# Patient Record
Sex: FEMALE | Race: WHITE | Employment: FULL TIME | URBAN - METROPOLITAN AREA
[De-identification: names, ages, dates, MRNs, and addresses within clinical notes are randomized per-mention and may not be internally consistent; named-entity substitution may affect disease eponyms.]

---

## 2017-01-03 ENCOUNTER — HOSPITAL ENCOUNTER (INPATIENT)
Age: 22
LOS: 2 days | Discharge: HOME | DRG: 770 | End: 2017-01-05
Attending: OBSTETRICS & GYNECOLOGY | Admitting: OBSTETRICS & GYNECOLOGY
Payer: COMMERCIAL

## 2017-01-03 ENCOUNTER — ANESTHESIA EVENT (OUTPATIENT)
Dept: SURGERY | Age: 22
DRG: 770 | End: 2017-01-03
Payer: COMMERCIAL

## 2017-01-03 PROBLEM — O03.5 ENDOMETRITIS FOLLOWING ABORTIVE PREGNANCY: Status: ACTIVE | Noted: 2017-01-03

## 2017-01-03 LAB
ALBUMIN SERPL BCP-MCNC: 3.3 G/DL (ref 3.5–5)
ALBUMIN/GLOB SERPL: 0.9 {RATIO} (ref 1.1–2.2)
ALP SERPL-CCNC: 59 U/L (ref 45–117)
ALT SERPL-CCNC: 21 U/L (ref 12–78)
ANION GAP BLD CALC-SCNC: 8 MMOL/L (ref 5–15)
AST SERPL W P-5'-P-CCNC: 12 U/L (ref 15–37)
BASOPHILS # BLD AUTO: 0 K/UL (ref 0–0.1)
BASOPHILS # BLD: 0 % (ref 0–1)
BILIRUB SERPL-MCNC: 0.2 MG/DL (ref 0.2–1)
BUN SERPL-MCNC: 13 MG/DL (ref 6–20)
BUN/CREAT SERPL: 17 (ref 12–20)
CALCIUM SERPL-MCNC: 8.3 MG/DL (ref 8.5–10.1)
CHLORIDE SERPL-SCNC: 108 MMOL/L (ref 97–108)
CO2 SERPL-SCNC: 27 MMOL/L (ref 21–32)
CREAT SERPL-MCNC: 0.77 MG/DL (ref 0.55–1.02)
EOSINOPHIL # BLD: 0 K/UL (ref 0–0.4)
EOSINOPHIL NFR BLD: 1 % (ref 0–7)
ERYTHROCYTE [DISTWIDTH] IN BLOOD BY AUTOMATED COUNT: 13.6 % (ref 11.5–14.5)
GLOBULIN SER CALC-MCNC: 3.8 G/DL (ref 2–4)
GLUCOSE SERPL-MCNC: 83 MG/DL (ref 65–100)
HCG SERPL-ACNC: 100 MIU/ML (ref 0–6)
HCT VFR BLD AUTO: 31.7 % (ref 35–47)
HGB BLD-MCNC: 10.2 G/DL (ref 11.5–16)
LACTATE SERPL-SCNC: 1 MMOL/L (ref 0.4–2)
LYMPHOCYTES # BLD AUTO: 31 % (ref 12–49)
LYMPHOCYTES # BLD: 1.9 K/UL (ref 0.8–3.5)
MCH RBC QN AUTO: 28.3 PG (ref 26–34)
MCHC RBC AUTO-ENTMCNC: 32.2 G/DL (ref 30–36.5)
MCV RBC AUTO: 88.1 FL (ref 80–99)
MONOCYTES # BLD: 0.5 K/UL (ref 0–1)
MONOCYTES NFR BLD AUTO: 8 % (ref 5–13)
NEUTS SEG # BLD: 3.7 K/UL (ref 1.8–8)
NEUTS SEG NFR BLD AUTO: 60 % (ref 32–75)
PLATELET # BLD AUTO: 203 K/UL (ref 150–400)
POTASSIUM SERPL-SCNC: 3.5 MMOL/L (ref 3.5–5.1)
PROT SERPL-MCNC: 7.1 G/DL (ref 6.4–8.2)
RBC # BLD AUTO: 3.6 M/UL (ref 3.8–5.2)
SODIUM SERPL-SCNC: 143 MMOL/L (ref 136–145)
WBC # BLD AUTO: 6.1 K/UL (ref 3.6–11)

## 2017-01-03 PROCEDURE — 87040 BLOOD CULTURE FOR BACTERIA: CPT | Performed by: OBSTETRICS & GYNECOLOGY

## 2017-01-03 PROCEDURE — 87086 URINE CULTURE/COLONY COUNT: CPT | Performed by: OBSTETRICS & GYNECOLOGY

## 2017-01-03 PROCEDURE — 85025 COMPLETE CBC W/AUTO DIFF WBC: CPT | Performed by: OBSTETRICS & GYNECOLOGY

## 2017-01-03 PROCEDURE — 65270000029 HC RM PRIVATE

## 2017-01-03 PROCEDURE — 83605 ASSAY OF LACTIC ACID: CPT | Performed by: OBSTETRICS & GYNECOLOGY

## 2017-01-03 PROCEDURE — 80053 COMPREHEN METABOLIC PANEL: CPT | Performed by: OBSTETRICS & GYNECOLOGY

## 2017-01-03 PROCEDURE — 36415 COLL VENOUS BLD VENIPUNCTURE: CPT | Performed by: OBSTETRICS & GYNECOLOGY

## 2017-01-03 PROCEDURE — 74011000258 HC RX REV CODE- 258: Performed by: OBSTETRICS & GYNECOLOGY

## 2017-01-03 PROCEDURE — 74011000250 HC RX REV CODE- 250: Performed by: OBSTETRICS & GYNECOLOGY

## 2017-01-03 PROCEDURE — 74011250637 HC RX REV CODE- 250/637: Performed by: OBSTETRICS & GYNECOLOGY

## 2017-01-03 PROCEDURE — 74011250636 HC RX REV CODE- 250/636: Performed by: OBSTETRICS & GYNECOLOGY

## 2017-01-03 PROCEDURE — 84702 CHORIONIC GONADOTROPIN TEST: CPT | Performed by: OBSTETRICS & GYNECOLOGY

## 2017-01-03 RX ORDER — OXYCODONE AND ACETAMINOPHEN 5; 325 MG/1; MG/1
2 TABLET ORAL
Status: DISCONTINUED | OUTPATIENT
Start: 2017-01-03 | End: 2017-01-05

## 2017-01-03 RX ORDER — OXYCODONE AND ACETAMINOPHEN 5; 325 MG/1; MG/1
1 TABLET ORAL ONCE
Status: DISCONTINUED | OUTPATIENT
Start: 2017-01-03 | End: 2017-01-03

## 2017-01-03 RX ORDER — LIDOCAINE HYDROCHLORIDE 10 MG/ML
0.1 INJECTION, SOLUTION EPIDURAL; INFILTRATION; INTRACAUDAL; PERINEURAL AS NEEDED
Status: DISCONTINUED | OUTPATIENT
Start: 2017-01-03 | End: 2017-01-04 | Stop reason: HOSPADM

## 2017-01-03 RX ORDER — HYDROMORPHONE HYDROCHLORIDE 1 MG/ML
.2-.5 INJECTION, SOLUTION INTRAMUSCULAR; INTRAVENOUS; SUBCUTANEOUS ONCE
Status: DISCONTINUED | OUTPATIENT
Start: 2017-01-03 | End: 2017-01-03

## 2017-01-03 RX ORDER — MORPHINE SULFATE 10 MG/ML
2 INJECTION, SOLUTION INTRAMUSCULAR; INTRAVENOUS
Status: DISCONTINUED | OUTPATIENT
Start: 2017-01-03 | End: 2017-01-03

## 2017-01-03 RX ORDER — HYDROMORPHONE HYDROCHLORIDE 1 MG/ML
0.5 INJECTION, SOLUTION INTRAMUSCULAR; INTRAVENOUS; SUBCUTANEOUS
Status: DISCONTINUED | OUTPATIENT
Start: 2017-01-03 | End: 2017-01-03

## 2017-01-03 RX ORDER — IBUPROFEN 400 MG/1
400 TABLET ORAL
Status: DISCONTINUED | OUTPATIENT
Start: 2017-01-03 | End: 2017-01-04

## 2017-01-03 RX ORDER — SODIUM CHLORIDE 0.9 % (FLUSH) 0.9 %
5-10 SYRINGE (ML) INJECTION EVERY 8 HOURS
Status: DISCONTINUED | OUTPATIENT
Start: 2017-01-03 | End: 2017-01-05 | Stop reason: HOSPADM

## 2017-01-03 RX ORDER — HYDROMORPHONE HYDROCHLORIDE 1 MG/ML
1 INJECTION, SOLUTION INTRAMUSCULAR; INTRAVENOUS; SUBCUTANEOUS
Status: DISCONTINUED | OUTPATIENT
Start: 2017-01-03 | End: 2017-01-04

## 2017-01-03 RX ORDER — SODIUM CHLORIDE 0.9 % (FLUSH) 0.9 %
5-10 SYRINGE (ML) INJECTION AS NEEDED
Status: DISCONTINUED | OUTPATIENT
Start: 2017-01-03 | End: 2017-01-03

## 2017-01-03 RX ORDER — ONDANSETRON 2 MG/ML
4 INJECTION INTRAMUSCULAR; INTRAVENOUS
Status: DISCONTINUED | OUTPATIENT
Start: 2017-01-03 | End: 2017-01-05 | Stop reason: HOSPADM

## 2017-01-03 RX ORDER — SODIUM CHLORIDE 0.9 % (FLUSH) 0.9 %
5-10 SYRINGE (ML) INJECTION EVERY 8 HOURS
Status: DISCONTINUED | OUTPATIENT
Start: 2017-01-03 | End: 2017-01-04 | Stop reason: HOSPADM

## 2017-01-03 RX ORDER — SODIUM CHLORIDE 0.9 % (FLUSH) 0.9 %
5-10 SYRINGE (ML) INJECTION AS NEEDED
Status: DISCONTINUED | OUTPATIENT
Start: 2017-01-03 | End: 2017-01-05 | Stop reason: HOSPADM

## 2017-01-03 RX ORDER — SODIUM CHLORIDE, SODIUM LACTATE, POTASSIUM CHLORIDE, CALCIUM CHLORIDE 600; 310; 30; 20 MG/100ML; MG/100ML; MG/100ML; MG/100ML
25 INJECTION, SOLUTION INTRAVENOUS CONTINUOUS
Status: DISCONTINUED | OUTPATIENT
Start: 2017-01-03 | End: 2017-01-03

## 2017-01-03 RX ORDER — SODIUM CHLORIDE, SODIUM LACTATE, POTASSIUM CHLORIDE, CALCIUM CHLORIDE 600; 310; 30; 20 MG/100ML; MG/100ML; MG/100ML; MG/100ML
150 INJECTION, SOLUTION INTRAVENOUS CONTINUOUS
Status: DISCONTINUED | OUTPATIENT
Start: 2017-01-03 | End: 2017-01-05 | Stop reason: HOSPADM

## 2017-01-03 RX ORDER — DIPHENHYDRAMINE HYDROCHLORIDE 50 MG/ML
12.5 INJECTION, SOLUTION INTRAMUSCULAR; INTRAVENOUS AS NEEDED
Status: DISCONTINUED | OUTPATIENT
Start: 2017-01-03 | End: 2017-01-03

## 2017-01-03 RX ORDER — NALOXONE HYDROCHLORIDE 0.4 MG/ML
0.4 INJECTION, SOLUTION INTRAMUSCULAR; INTRAVENOUS; SUBCUTANEOUS AS NEEDED
Status: DISCONTINUED | OUTPATIENT
Start: 2017-01-03 | End: 2017-01-05 | Stop reason: HOSPADM

## 2017-01-03 RX ORDER — OXYCODONE AND ACETAMINOPHEN 5; 325 MG/1; MG/1
1 TABLET ORAL
Status: DISCONTINUED | OUTPATIENT
Start: 2017-01-03 | End: 2017-01-03

## 2017-01-03 RX ORDER — FENTANYL CITRATE 50 UG/ML
25 INJECTION, SOLUTION INTRAMUSCULAR; INTRAVENOUS
Status: DISCONTINUED | OUTPATIENT
Start: 2017-01-03 | End: 2017-01-03

## 2017-01-03 RX ADMIN — CEFOXITIN SODIUM 2 G: 2 POWDER, FOR SOLUTION INTRAVENOUS at 19:45

## 2017-01-03 RX ADMIN — ONDANSETRON 4 MG: 2 INJECTION INTRAMUSCULAR; INTRAVENOUS at 15:40

## 2017-01-03 RX ADMIN — DOXYCYCLINE 100 MG: 100 INJECTION, POWDER, LYOPHILIZED, FOR SOLUTION INTRAVENOUS at 16:51

## 2017-01-03 RX ADMIN — HYDROMORPHONE HYDROCHLORIDE 0.5 MG: 1 INJECTION, SOLUTION INTRAMUSCULAR; INTRAVENOUS; SUBCUTANEOUS at 15:39

## 2017-01-03 RX ADMIN — HYDROMORPHONE HYDROCHLORIDE 1 MG: 1 INJECTION, SOLUTION INTRAMUSCULAR; INTRAVENOUS; SUBCUTANEOUS at 23:55

## 2017-01-03 RX ADMIN — OXYCODONE HYDROCHLORIDE AND ACETAMINOPHEN 1 TABLET: 5; 325 TABLET ORAL at 14:20

## 2017-01-03 RX ADMIN — Medication 10 ML: at 20:55

## 2017-01-03 RX ADMIN — CEFOXITIN SODIUM 2 G: 2 POWDER, FOR SOLUTION INTRAVENOUS at 16:00

## 2017-01-03 RX ADMIN — Medication 10 ML: at 15:53

## 2017-01-03 RX ADMIN — OXYCODONE HYDROCHLORIDE AND ACETAMINOPHEN 1 TABLET: 5; 325 TABLET ORAL at 20:55

## 2017-01-03 RX ADMIN — HYDROMORPHONE HYDROCHLORIDE 0.5 MG: 1 INJECTION, SOLUTION INTRAMUSCULAR; INTRAVENOUS; SUBCUTANEOUS at 19:42

## 2017-01-03 RX ADMIN — Medication 10 ML: at 23:55

## 2017-01-03 RX ADMIN — SODIUM CHLORIDE, SODIUM LACTATE, POTASSIUM CHLORIDE, AND CALCIUM CHLORIDE 150 ML/HR: 600; 310; 30; 20 INJECTION, SOLUTION INTRAVENOUS at 15:52

## 2017-01-03 RX ADMIN — ONDANSETRON 4 MG: 2 INJECTION INTRAMUSCULAR; INTRAVENOUS at 19:42

## 2017-01-03 NOTE — PROGRESS NOTES
Primary Nurse Jon Villa and Kin Dior, RN performed a dual skin assessment on this patient No impairment noted  Donald score is 22; Slight redness noted to bilateral AC areas;  Patient stated it came from previous IV administration from hospital yesterday

## 2017-01-03 NOTE — PERIOP NOTES
Per Mi at [facility], pt admitted to Room 2103 for IV abx.   MD will do ultrasound in am.

## 2017-01-03 NOTE — IP AVS SNAPSHOT
[address] [facility] 
[phone] Patient: Tamiko Downs MRN: [id number] :[id number] You are allergic to the following Allergen Reactions Tramadol Itching Immunizations Administered for This Admission Name Date Influenza Vaccine (Quad) PF  Deferred () Recent Documentation Height Weight Breastfeeding? BMI OB Status Smoking Status 1.6 m 63 kg No 24.62 kg/m2 Pregnant Light Tobacco Smoker Unresulted Labs Order Current Status CULTURE, BLOOD, PAIRED Preliminary result Emergency Contacts Name Discharge Info Relation Home Work Mobile Darrin Garcia DISCHARGE CAREGIVER [3] Parent [1]   [phone] About your hospitalization You were admitted on:  January 3, 2017 You last received care in the:  [facility] 2 GENERAL SURGERY You were discharged on:  2017 Unit phone number:  [phone] Why you were hospitalized Your primary diagnosis was:  Not on File Your diagnoses also included:  Endometritis Following Abortive Pregnancy Providers Seen During Your Hospitalizations Provider Role Specialty Primary office phone Teofilo Caldwell MD Attending Provider Obstetrics & Gynecology [phone] Your Primary Care Physician (PCP) Primary Care Physician Office Phone Office Fax Nilay Martel [phone] Follow-up Information Follow up With Details Comments Contact Info Jordan Maya [phone] [facility] [address] [phone] Current Discharge Medication List  
  
START taking these medications Dose & Instructions Dispensing Information Comments Morning Noon Evening Bedtime  
 ibuprofen 800 mg tablet Commonly known as:  MOTRIN Your next dose is: Today, Tomorrow Other:  _________  Dose:  800 mg  
 Take 1 Tab by mouth three (3) times daily. Quantity:  60 Tab Refills:  1  
     
   
   
   
  
 ondansetron 4 mg disintegrating tablet Commonly known as:  ZOFRAN ODT Your next dose is: Today, Tomorrow Other:  _________ Dose:  4 mg Take 1 Tab by mouth every eight (8) hours as needed for Nausea. Quantity:  15 Tab Refills:  0 CONTINUE these medications which have NOT CHANGED Dose & Instructions Dispensing Information Comments Morning Noon Evening Bedtime  
 acetaminophen 325 mg tablet Commonly known as:  TYLENOL Your next dose is: Today, Tomorrow Other:  _________ Dose:  650 mg Take 2 Tabs by mouth every six (6) hours as needed for Pain. Quantity:  20 Tab Refills:  0  
     
   
   
   
  
 citalopram 10 mg tablet Commonly known as:  Lenny Peel Your next dose is: Today, Tomorrow Other:  _________ Take  by mouth daily. Refills:  0 HYDROcodone-acetaminophen 5-325 mg per tablet Commonly known as:  Silva Jack Your next dose is: Today, Tomorrow Other:  _________ Dose:  1 Tab Take 1 Tab by mouth every four (4) hours as needed for Pain. Max Daily Amount: 6 Tabs. Quantity:  24 Tab Refills:  0 Where to Get Your Medications Information on where to get these meds will be given to you by the nurse or doctor. ! Ask your nurse or doctor about these medications HYDROcodone-acetaminophen 5-325 mg per tablet  
 ibuprofen 800 mg tablet  
 ondansetron 4 mg disintegrating tablet Discharge Instructions After Care Instructions For Your D&C 
 
 
1. You may resume your usual diet once the nausea resolves. Initially, try sips of warm fluids and a bland diet. 2. Avoid heavy lifting and straining. Gradually increase your activity. First, try walking and doing light activity around the house. Resume your normal habits if no significant discomfort or bleeding develops. Most women can return to work within one to four days after this procedure. 3. You may take showers. Avoid using a tub bath, swimming pool or hot tub until after your check-up. 4. Do not place anything in your vagina until after your postoperative visit. Do not  
douche, use tampons, or have intercourse because this may cause bleeding and  
infection. 5. You may initially experience a heavy bloody discharge. This should not be more than your menstrual flow. Over the next several days, the flow should steadily decrease. 6. Typically following the procedure, there is little or no pain. You may feel cramps in your lower abdomen. Tylenol may relieve mild cramping. If pain medication does not improve your symptoms, you should contact your physician. 7. Contact the office if you have excessive bleeding (saturating a pad an hour for two hours or passing large clots). It is also necessary to speak with your physician if you develop chills, a temperature greater than 100.4, difficulty voiding or burning on urination. 8. Your physician may want to see you in the office after your D&C. Please call for an appointment if this has not already been arranged. Our office phone number is [phone]. If appropriate, the microscopic results from your procedure will be discussed at this follow-up visit. Discharge Orders None [employer] Announcement We are excited to announce that we are making your provider's discharge notes available to you in [employer]. You will see these notes when they are completed and signed by the physician that discharged you from your recent hospital stay.   If you have any questions or concerns about any information you see in [employer], please call the [place] Department where you were seen or reach out to your Primary Care Provider for more information about your plan of care. Introducing [address]! [facility] introduces [employer] patient portal. Now you can access parts of your medical record, email your doctor's office, and request medication refills online. 1. In your internet browser, go to [email or web address] 2. Click on the First Time User? Click Here link in the Sign In box. You will see the New Member Sign Up page. 3. Enter your [employer] Access Code exactly as it appears below. You will not need to use this code after youve completed the sign-up process. If you do not sign up before the expiration date, you must request a new code. · [employer] Access Code: [email or web address] Expires: 3/2/2017  4:00 PM 
 
4. Enter the last four digits of your Social Security Number (xxxx) and Date of Birth (mm/dd/yyyy) as indicated and click Submit. You will be taken to the next sign-up page. 5. Create a [employer] ID. This will be your [employer] login ID and cannot be changed, so think of one that is secure and easy to remember. 6. Create a [employer] password. You can change your password at any time. 7. Enter your Password Reset Question and Answer. This can be used at a later time if you forget your password. 8. Enter your e-mail address. You will receive e-mail notification when new information is available in [address] 9. Click Sign Up. You can now view and download portions of your medical record. 10. Click the Download Summary menu link to download a portable copy of your medical information. If you have questions, please visit the Frequently Asked Questions section of the [employer] website. Remember, [employer] is NOT to be used for urgent needs. For medical emergencies, dial 911. Now available from your iPhone and Android! General Information Please provide this summary of care documentation to your next provider. Patient Signature:  ____________________________________________________________ Date:  ____________________________________________________________  
  
Earnstine Amadou Provider Signature:  ____________________________________________________________ Date:  ____________________________________________________________

## 2017-01-03 NOTE — PROGRESS NOTES
End of Shift Nursing Note    Bedside shift change report given to Selena De La Cruz (oncoming nurse) by Vanessa Varela RN (offgoing nurse). Report included the following information SBAR, Kardex, Intake/Output and MAR. Zone Phone:   [phone]    Significant changes during shift:    none   Non-emergent issues for physician to address:   none     Number times ambulated in hallway past shift: 0      Number of times OOB to chair past shift: 0    POD #: admitted 1/3/17     Vital Signs:    Temp: 98.4 °F (36.9 °C)     Pulse (Heart Rate): 81     BP: 107/58     Resp Rate: 16     O2 Sat (%): 100 %       Skin Integrity:      Wounds: no   Dressings Present: no    Wound Concerns: no      GI:    Current diet:  DIET REGULAR  DIET NPO    Nausea: YES  Vomiting: YES  Bowel Sounds: YES  Flatus: YES  Last Bowel Movement: several days ago     Respiratory:  Supplemental O2: NO     Incentive Spirometer: NO    Coughing and Deep Breathing: NO  Oral Care: YES  Understanding (patient/family education): YES   Getting out of bed: YES  Head of bed elevation: YES    Patient Safety:    Falls Score: 1  Mobility Score: 1  Bed Alarm On? NO  Sitter? NO      Opportunity for questions and clarification was given to oncoming nurse. Patient bed is in supine position, side rails are up x 2, , call bell within reach and patient not in distress.     Garry Tello

## 2017-01-03 NOTE — H&P
Vital Signs   24Years Old Female  Height:  65 inches  Weight: 141 pounds  BMI:      23.55  BSA:      1.71  BP:       100/62    Past Pregnancy History   : 3  Para:     2  Aborta:  1  Term: 2, Premature: 0, Living Children: 2, Vaginal Deliveries: 2, C-Sections: 0, Elect. Ab: 0, Spont. Ab: 1, Ectopics: 0    Gynecologic History   Patient is unsure of LMP  Additional Menses Information: mid october   Does patient have any problems with urine leakage? no  Does patient have any other bladder problems? no  History of abnormal pap: no  Gardasil Injection History: Not Applicable  Pt currently sexually active: yes  Current Contraception: None. History of STD: no   The patient opts not to have HIV testing today. Urine Pregnancy Test         Urine Pregnancy Test Result: positive  Test Performed By: Zenaida Burgess - Team 1 GREG at January  3, 2017 11:19 AM        Visit Type:  Problem GYN  Primary Provider:  Magali Kern MD    CC:  follow up after SAB. History of Present Illness:  23 YO patient presents today for ER follow up after SAB. Pateint notes she miscarried 2 weeks ago but is still passing large clots and tissue and has severe pain. Patient notes she has been feverish with headache. Patient notes she has not had medical attention since her miscarriage 2 weeks ago, she notes she was 11 weeks when she miscarried. Patient notes she did not have a dating ultrasound so the 11 weeks is coming from her LMP   Has US report from [facility] [address]   shows interval decrease in ES frm prior US 16  No GS but concern for retained POC following SAB   Called [facility] to access further labs and records       temp today 99.8      last seen , transferred practices during pregnancy due to insurance coverage- delivered there @39wks. Has not been seen there since, was scheduled for US & visit but miscarried. Has not had bloodwork/US anywhere besides ER.     Allergies    This patient has no known allergies. Medications Removed from Medication List          Past Medical History:     Reviewed history from 2011 and no changes required:        neg    Past Surgical History:     Reviewed history from 2011 and no changes required:        Spontaneous Vaginal Delivery male [id number] Dr. Alaina Clark         2015  39wks   (Dr Alee Cowan)     Family History Summary:      Reviewed history Last on 2015 and no changes required:2017  Father (Primo Brady) - Has Family History of Hypertension - Entered On: 2015  Other family member - Has Family History of Diabetes - Entered On: 2015    General Comments - FH:  Family history transferred to [address] [place] [address]          Social History:     Reviewed history from 2011 and no changes required:        Single        home schooled      Previous Tobacco Use: Signed On - 2015  Smoked Tobacco Use:  Former smoker     Cigarettes:  Yes    Previous Alcohol Use: Signed On - 2015  Alcohol use:  no  Seatbelt use:  preg- %    PAP Smear History:     Date of Last PAP Smear:  2011      Past Pregnancy History      :  3     Term Births:  2     Premature Births: 0     Living Children: 2     Para:   2     Mult. Births:  0     Prev : 0     Aborta:  1     Elect. Ab:  0     Spont. Ab:  1     Ectopics:  0    Pregnancy # 1     Delivery date:   2011     Weeks Gestation: 39w      labor:   no     Delivery type:        Anesthesia type:   epidural     Delivery location:   [facility]     Infant Sex:  Male     Birth weight:  3.01kg     Name:  Imtiaz Davenport     Comments:  Delivery Clinician Dr. Alaina Clark  Second degree laceration. 11 Circumcision consent obtained. Dorsal Penile Nerve Block (DPNB) 0.8cc of 1% Lidocaine and Sweet Ease. 1.1 Gomco used. Tolerated well. Dr. Madelon Sever  . ................................................................ Erwin Roberts **Surg Charges/Prorates  2011 10:22 AM      Pregnancy # 2     Delivery date:   7/2016     Infant Sex:  Female     Comments:  uncomplicated obeyviery  Dr Milton Small       Review of Systems        See HPI    Except as noted in the HPI, the review of systems is negative for General and . General Medical Physical Exam:     General Appearance:       lethargic     Head: Inspection:  normocephalic without obvious abnormalities    Eyes:        External:  EOM intact    Ears, Nose, Throat:        External:  normocephalic and atraumatic       Hearing:  grossly intact    Neck:        Neck:  supple; no masses; trachea midline       Thyroid:  no nodules, masses, tenderness, or enlargement    Respiratory:        Resp. effort:  no use of accessory muscles       Auscultation:   no rales, rhonchi, or wheezes    Cardiovascular: Auscultation:   normal S1 and  S2; no murmur, rub, or gallop       Peripheral circ: no cyanosis, clubbing, or edema    Gastrointestinal:        Abdomen:  diffuse tenderness       Liver/spleen:   normal to percussion; no enlargement or nodularity       Hernia:  no hernias    Genitourinary:        Ext. genitalia: normal appearance; no lesions or discharge       Urethra:  no discharge       Bladder:  no cystocele       Vagina:  old blood in vault.  No active bleeding        Cervix:  normal appearance; no lesions or discharge       Uterus:  mobile, tender        Adnexa:  no masses or tenderness    Musculoskeletal:        Gait/station:  normal gait    Lymphatic:        Neck:  no cervical adenopathy       Axilla:  no axillary adenopathy       Inguinal:  no inguinal adenopathy    Skin:        Inspection:  no rashes, suspicious lesions, or ulcerations       Palpation:  warm to touch     Neurological:        Other:  grossly intact    Psychiatric:       Orientation:  oriented to time, place, and person            Impression & Recommendations:    Problem # 1:  Retained products of conception (ICD-639.1) (PJS10-H77.1)  most recent US 1/2/17 done at [place]  with thickened ES and concern for retained POC   No images available   Per patient she was told to follow-up emergently as needed IV antibiotics and D&C  Was given shot of antibiotics in Ed, home with RX for doxycycline--unable to tolerate meds due to vomiting   bhcg yesterday 123   CBC Hgb 11.4, WBC 6.9   UA negative   Direct admit for IV antibiotics. Repeat labs CBC, bhcg in am   Scheduled for suction D&C tomorrow after receiving IV antibiotics. However if bhcg declining would repeat US prior to [facility]    Orders:  High Complex Est level 5 (QPZ-69528)  Specimen Handling (CPT-41900)  GC/CH ([id number])      Problem # 2:  Endometritis acute (ICD-615.0) (GIN98-K18.0)  clinical picture consistent with acute endometritis following incomplete SAB   GC/Ct sent from office   will monitor for signs of sepsis   Admt for IV antibiotics   Cefoxitin, doxycycline  IV fluid hydration   Pain control   Serial exams. [place] in am for surgery suction D&C tomorrow  Discussed plan with Dr Dana cross MD today   Orders:  High Complex Est level 5 (LSS-55780)  Specimen Handling (CPT-48830)  GC/CH ([id number])      Other Orders:  BHCG Qual Urine done today (CPT-22010)  BHCG Qual Urine done today (CPT-44794)  BHCG Qual Urine done today (CPT-42532)  BHCG Qual Urine done today (CPT-44600)  BHCG Qual Urine done today (RJL-46957)    Medications (at conclusion of this visit)          Electronically signed by Satish Nunez MD on 01/03/2017 at 12:17 PM    ________________________________________________________________________    Date of Admission Update:  Goldy Pelayo was seen and examined. 23 y/o [id number] ~2wks s/p SAB admitted directly from the office for endometritis and questionable retained POC. No PN care at time of SAB - 11wks by dates but no confirmed IUP. Pt reports heavy vaginal bleeding and seeing \"a sac. \" Developed F(103)/C/body aches starting 3 days ago and presented [facility] ED yesterday.  Thought she merely had the flu but denies sick contacts. Labs yesterday apparently wnl (normal WBC) and pelvic US showing thickened EM. Given shot of abx and sent home w/ doxycycline Rx which she was unable to tolerate. Apparently told to f/u in office today as she \"needs admission for IV antibiotics. \" Pt having foul-smelling vaginal bleeding and pelvic pain. Some associated N/V. No void in 3 days. Normal BMs. Visit Vitals    BP 95/51 (BP 1 Location: Right arm, BP Patient Position: At rest)    Pulse 100    Temp 97.8 °F (36.6 °C)    Resp 16    LMP 10/14/2016 (Approximate)    SpO2 100%    Breastfeeding No     Physical Exam   Constitutional: She is oriented to person, place, and time and well-developed, well-nourished, and in no distress. Appears fatigued   HENT:   Head: Normocephalic and atraumatic. Eyes: EOM are normal.   Neck: Neck supple. Cardiovascular: Normal rate, regular rhythm and normal heart sounds. Pulmonary/Chest: Effort normal.   Abdominal: Soft. There is tenderness in the suprapubic area and left lower quadrant. There is no rebound, no guarding and no CVA tenderness. Genitourinary:   Genitourinary Comments: See office exam   Musculoskeletal: Normal range of motion. Neurological: She is alert and oriented to person, place, and time. Skin: Skin is warm and dry. There is pallor.    Psychiatric: Mood, memory, affect and judgment normal.     A/P: 25 y/o  with endometritis following SAB  Admit for IV broad spectrum abx - cefoxitin & doxycycline  IV hydration  Labs/cultures pending, repeat hCG and CBC in AM  GC/CT pending (obtained in office today)  NPO after MN in preparation for [facility]  tomorrow if deemed necessary  Possible pelvic US tomorrow    Signed By: Nba Tobar MD     January 3, 2017 3:38 PM

## 2017-01-03 NOTE — PERIOP NOTES
Spoke to [employer] and advised to have pt NPO after midnight; consent signed if available; have IV heplocked.  Will transport by [facility] if pt does need to come to [facility]

## 2017-01-03 NOTE — IP AVS SNAPSHOT
Current Discharge Medication List  
  
Take these medications at their scheduled times Dose & Instructions Dispensing Information Comments Morning Noon Evening Bedtime  
 citalopram 10 mg tablet Commonly known as:  Sid Sanon Your next dose is: Today, Tomorrow Other:  ____________ Take  by mouth daily. Refills:  0  
     
   
   
   
  
 ibuprofen 800 mg tablet Commonly known as:  MOTRIN Your next dose is: Today, Tomorrow Other:  ____________ Dose:  800 mg Take 1 Tab by mouth three (3) times daily. Quantity:  60 Tab Refills:  1 Take these medications as needed Dose & Instructions Dispensing Information Comments Morning Noon Evening Bedtime  
 acetaminophen 325 mg tablet Commonly known as:  TYLENOL Your next dose is: Today, Tomorrow Other:  ____________ Dose:  650 mg Take 2 Tabs by mouth every six (6) hours as needed for Pain. Quantity:  20 Tab Refills:  0 HYDROcodone-acetaminophen 5-325 mg per tablet Commonly known as:  Devin French Your next dose is: Today, Tomorrow Other:  ____________ Dose:  1 Tab Take 1 Tab by mouth every four (4) hours as needed for Pain. Max Daily Amount: 6 Tabs. Quantity:  24 Tab Refills:  0  
     
   
   
   
  
 ondansetron 4 mg disintegrating tablet Commonly known as:  ZOFRAN ODT Your next dose is: Today, Tomorrow Other:  ____________ Dose:  4 mg Take 1 Tab by mouth every eight (8) hours as needed for Nausea. Quantity:  15 Tab Refills:  0 Where to Get Your Medications Information about where to get these medications is not yet available ! Ask your nurse or doctor about these medications HYDROcodone-acetaminophen 5-325 mg per tablet  
 ibuprofen 800 mg tablet  
 ondansetron 4 mg disintegrating tablet

## 2017-01-04 ENCOUNTER — ANESTHESIA (OUTPATIENT)
Dept: SURGERY | Age: 22
DRG: 770 | End: 2017-01-04
Payer: COMMERCIAL

## 2017-01-04 LAB
BASOPHILS # BLD AUTO: 0 K/UL (ref 0–0.1)
BASOPHILS # BLD: 0 % (ref 0–1)
EOSINOPHIL # BLD: 0.1 K/UL (ref 0–0.4)
EOSINOPHIL NFR BLD: 1 % (ref 0–7)
ERYTHROCYTE [DISTWIDTH] IN BLOOD BY AUTOMATED COUNT: 13.6 % (ref 11.5–14.5)
HCG SERPL-ACNC: 78 MIU/ML (ref 0–6)
HCT VFR BLD AUTO: 27.9 % (ref 35–47)
HGB BLD-MCNC: 9 G/DL (ref 11.5–16)
LYMPHOCYTES # BLD AUTO: 41 % (ref 12–49)
LYMPHOCYTES # BLD: 2.5 K/UL (ref 0.8–3.5)
MCH RBC QN AUTO: 28.6 PG (ref 26–34)
MCHC RBC AUTO-ENTMCNC: 32.3 G/DL (ref 30–36.5)
MCV RBC AUTO: 88.6 FL (ref 80–99)
MONOCYTES # BLD: 0.4 K/UL (ref 0–1)
MONOCYTES NFR BLD AUTO: 7 % (ref 5–13)
NEUTS SEG # BLD: 3.1 K/UL (ref 1.8–8)
NEUTS SEG NFR BLD AUTO: 51 % (ref 32–75)
PLATELET # BLD AUTO: 189 K/UL (ref 150–400)
RBC # BLD AUTO: 3.15 M/UL (ref 3.8–5.2)
WBC # BLD AUTO: 6 K/UL (ref 3.6–11)

## 2017-01-04 PROCEDURE — 77030018836 HC SOL IRR NACL ICUM -A: Performed by: OBSTETRICS & GYNECOLOGY

## 2017-01-04 PROCEDURE — 74011250636 HC RX REV CODE- 250/636

## 2017-01-04 PROCEDURE — 74011000250 HC RX REV CODE- 250: Performed by: OBSTETRICS & GYNECOLOGY

## 2017-01-04 PROCEDURE — 88305 TISSUE EXAM BY PATHOLOGIST: CPT | Performed by: OBSTETRICS & GYNECOLOGY

## 2017-01-04 PROCEDURE — 76210000035 HC AMBSU PH I REC 1 TO 1.5 HR: Performed by: OBSTETRICS & GYNECOLOGY

## 2017-01-04 PROCEDURE — 74011250636 HC RX REV CODE- 250/636: Performed by: OBSTETRICS & GYNECOLOGY

## 2017-01-04 PROCEDURE — 10D17ZZ EXTRACTION OF PRODUCTS OF CONCEPTION, RETAINED, VIA NATURAL OR ARTIFICIAL OPENING: ICD-10-PCS | Performed by: OBSTETRICS & GYNECOLOGY

## 2017-01-04 PROCEDURE — 74011000258 HC RX REV CODE- 258: Performed by: OBSTETRICS & GYNECOLOGY

## 2017-01-04 PROCEDURE — 84702 CHORIONIC GONADOTROPIN TEST: CPT | Performed by: OBSTETRICS & GYNECOLOGY

## 2017-01-04 PROCEDURE — 74011250637 HC RX REV CODE- 250/637: Performed by: OBSTETRICS & GYNECOLOGY

## 2017-01-04 PROCEDURE — 36415 COLL VENOUS BLD VENIPUNCTURE: CPT | Performed by: OBSTETRICS & GYNECOLOGY

## 2017-01-04 PROCEDURE — 85025 COMPLETE CBC W/AUTO DIFF WBC: CPT | Performed by: OBSTETRICS & GYNECOLOGY

## 2017-01-04 PROCEDURE — 77030020255 HC SOL INJ LR 1000ML BG: Performed by: OBSTETRICS & GYNECOLOGY

## 2017-01-04 PROCEDURE — 65270000029 HC RM PRIVATE

## 2017-01-04 PROCEDURE — 77030030437 HC FLTR UTER VAC OCOA -A: Performed by: OBSTETRICS & GYNECOLOGY

## 2017-01-04 PROCEDURE — 77030018846 HC SOL IRR STRL H20 ICUM -A: Performed by: OBSTETRICS & GYNECOLOGY

## 2017-01-04 PROCEDURE — 74011250636 HC RX REV CODE- 250/636: Performed by: ANESTHESIOLOGY

## 2017-01-04 PROCEDURE — 77030008578 HC TBNG UTER SUC BUSS -A: Performed by: OBSTETRICS & GYNECOLOGY

## 2017-01-04 PROCEDURE — 76060000061 HC AMB SURG ANES 0.5 TO 1 HR: Performed by: OBSTETRICS & GYNECOLOGY

## 2017-01-04 PROCEDURE — 76030000000 HC AMB SURG OR TIME 0.5 TO 1: Performed by: OBSTETRICS & GYNECOLOGY

## 2017-01-04 PROCEDURE — 77030003666 HC NDL SPINAL BD -A: Performed by: OBSTETRICS & GYNECOLOGY

## 2017-01-04 PROCEDURE — 74011000250 HC RX REV CODE- 250

## 2017-01-04 PROCEDURE — 74011250637 HC RX REV CODE- 250/637: Performed by: ANESTHESIOLOGY

## 2017-01-04 RX ORDER — DOXYCYCLINE HYCLATE 100 MG
100 TABLET ORAL EVERY 12 HOURS
Status: DISCONTINUED | OUTPATIENT
Start: 2017-01-04 | End: 2017-01-04

## 2017-01-04 RX ORDER — MIDAZOLAM HYDROCHLORIDE 1 MG/ML
INJECTION, SOLUTION INTRAMUSCULAR; INTRAVENOUS AS NEEDED
Status: DISCONTINUED | OUTPATIENT
Start: 2017-01-04 | End: 2017-01-04 | Stop reason: HOSPADM

## 2017-01-04 RX ORDER — IBUPROFEN 200 MG
1 TABLET ORAL DAILY
Status: DISCONTINUED | OUTPATIENT
Start: 2017-01-05 | End: 2017-01-04

## 2017-01-04 RX ORDER — SCOLOPAMINE TRANSDERMAL SYSTEM 1 MG/1
1.5 PATCH, EXTENDED RELEASE TRANSDERMAL
Status: DISCONTINUED | OUTPATIENT
Start: 2017-01-04 | End: 2017-01-05 | Stop reason: HOSPADM

## 2017-01-04 RX ORDER — SODIUM CHLORIDE, SODIUM LACTATE, POTASSIUM CHLORIDE, CALCIUM CHLORIDE 600; 310; 30; 20 MG/100ML; MG/100ML; MG/100ML; MG/100ML
25 INJECTION, SOLUTION INTRAVENOUS CONTINUOUS
Status: DISCONTINUED | OUTPATIENT
Start: 2017-01-04 | End: 2017-01-05 | Stop reason: HOSPADM

## 2017-01-04 RX ORDER — IBUPROFEN 200 MG
1 TABLET ORAL DAILY
Status: DISCONTINUED | OUTPATIENT
Start: 2017-01-04 | End: 2017-01-05 | Stop reason: HOSPADM

## 2017-01-04 RX ORDER — KETOROLAC TROMETHAMINE 30 MG/ML
30 INJECTION, SOLUTION INTRAMUSCULAR; INTRAVENOUS
Status: ACTIVE | OUTPATIENT
Start: 2017-01-04 | End: 2017-01-04

## 2017-01-04 RX ORDER — FENTANYL CITRATE 50 UG/ML
INJECTION, SOLUTION INTRAMUSCULAR; INTRAVENOUS AS NEEDED
Status: DISCONTINUED | OUTPATIENT
Start: 2017-01-04 | End: 2017-01-04 | Stop reason: HOSPADM

## 2017-01-04 RX ORDER — SODIUM CHLORIDE, SODIUM LACTATE, POTASSIUM CHLORIDE, CALCIUM CHLORIDE 600; 310; 30; 20 MG/100ML; MG/100ML; MG/100ML; MG/100ML
INJECTION, SOLUTION INTRAVENOUS
Status: DISCONTINUED | OUTPATIENT
Start: 2017-01-04 | End: 2017-01-04 | Stop reason: HOSPADM

## 2017-01-04 RX ORDER — CITALOPRAM 20 MG/1
10 TABLET, FILM COATED ORAL DAILY
Status: DISCONTINUED | OUTPATIENT
Start: 2017-01-05 | End: 2017-01-05 | Stop reason: HOSPADM

## 2017-01-04 RX ORDER — LIDOCAINE HYDROCHLORIDE 20 MG/ML
INJECTION, SOLUTION EPIDURAL; INFILTRATION; INTRACAUDAL; PERINEURAL AS NEEDED
Status: DISCONTINUED | OUTPATIENT
Start: 2017-01-04 | End: 2017-01-04 | Stop reason: HOSPADM

## 2017-01-04 RX ORDER — LIDOCAINE HYDROCHLORIDE 10 MG/ML
10 INJECTION, SOLUTION EPIDURAL; INFILTRATION; INTRACAUDAL; PERINEURAL ONCE
Status: DISCONTINUED | OUTPATIENT
Start: 2017-01-04 | End: 2017-01-04 | Stop reason: HOSPADM

## 2017-01-04 RX ORDER — IBUPROFEN 400 MG/1
800 TABLET ORAL 3 TIMES DAILY
Status: DISCONTINUED | OUTPATIENT
Start: 2017-01-04 | End: 2017-01-05 | Stop reason: HOSPADM

## 2017-01-04 RX ORDER — KETOROLAC TROMETHAMINE 30 MG/ML
INJECTION, SOLUTION INTRAMUSCULAR; INTRAVENOUS
Status: COMPLETED
Start: 2017-01-04 | End: 2017-01-04

## 2017-01-04 RX ORDER — PROPOFOL 10 MG/ML
INJECTION, EMULSION INTRAVENOUS AS NEEDED
Status: DISCONTINUED | OUTPATIENT
Start: 2017-01-04 | End: 2017-01-04 | Stop reason: HOSPADM

## 2017-01-04 RX ADMIN — ONDANSETRON 4 MG: 2 INJECTION INTRAMUSCULAR; INTRAVENOUS at 10:17

## 2017-01-04 RX ADMIN — CEFOXITIN SODIUM 2 G: 2 POWDER, FOR SOLUTION INTRAVENOUS at 08:13

## 2017-01-04 RX ADMIN — MIDAZOLAM HYDROCHLORIDE 2 MG: 1 INJECTION, SOLUTION INTRAMUSCULAR; INTRAVENOUS at 13:02

## 2017-01-04 RX ADMIN — IBUPROFEN 800 MG: 400 TABLET ORAL at 15:53

## 2017-01-04 RX ADMIN — MIDAZOLAM HYDROCHLORIDE 1 MG: 1 INJECTION, SOLUTION INTRAMUSCULAR; INTRAVENOUS at 13:10

## 2017-01-04 RX ADMIN — Medication 10 ML: at 21:08

## 2017-01-04 RX ADMIN — OXYCODONE HYDROCHLORIDE AND ACETAMINOPHEN 2 TABLET: 5; 325 TABLET ORAL at 09:06

## 2017-01-04 RX ADMIN — PROPOFOL 100 MG: 10 INJECTION, EMULSION INTRAVENOUS at 13:10

## 2017-01-04 RX ADMIN — SODIUM CHLORIDE, SODIUM LACTATE, POTASSIUM CHLORIDE, AND CALCIUM CHLORIDE 150 ML/HR: 600; 310; 30; 20 INJECTION, SOLUTION INTRAVENOUS at 01:35

## 2017-01-04 RX ADMIN — OXYCODONE HYDROCHLORIDE AND ACETAMINOPHEN 2 TABLET: 5; 325 TABLET ORAL at 15:52

## 2017-01-04 RX ADMIN — DOXYCYCLINE 100 MG: 100 INJECTION, POWDER, LYOPHILIZED, FOR SOLUTION INTRAVENOUS at 17:18

## 2017-01-04 RX ADMIN — KETOROLAC TROMETHAMINE 30 MG: 30 INJECTION, SOLUTION INTRAMUSCULAR at 13:54

## 2017-01-04 RX ADMIN — CEFOXITIN SODIUM 2 G: 2 POWDER, FOR SOLUTION INTRAVENOUS at 15:53

## 2017-01-04 RX ADMIN — OXYCODONE HYDROCHLORIDE AND ACETAMINOPHEN 2 TABLET: 5; 325 TABLET ORAL at 01:44

## 2017-01-04 RX ADMIN — ONDANSETRON 4 MG: 2 INJECTION INTRAMUSCULAR; INTRAVENOUS at 05:27

## 2017-01-04 RX ADMIN — PROPOFOL 100 MG: 10 INJECTION, EMULSION INTRAVENOUS at 13:05

## 2017-01-04 RX ADMIN — HYDROMORPHONE HYDROCHLORIDE 1 MG: 1 INJECTION, SOLUTION INTRAMUSCULAR; INTRAVENOUS; SUBCUTANEOUS at 05:27

## 2017-01-04 RX ADMIN — SODIUM CHLORIDE, SODIUM LACTATE, POTASSIUM CHLORIDE, CALCIUM CHLORIDE: 600; 310; 30; 20 INJECTION, SOLUTION INTRAVENOUS at 13:02

## 2017-01-04 RX ADMIN — LIDOCAINE HYDROCHLORIDE 100 MG: 20 INJECTION, SOLUTION EPIDURAL; INFILTRATION; INTRACAUDAL; PERINEURAL at 13:05

## 2017-01-04 RX ADMIN — FENTANYL CITRATE 50 MCG: 50 INJECTION, SOLUTION INTRAMUSCULAR; INTRAVENOUS at 13:14

## 2017-01-04 RX ADMIN — PROPOFOL 50 MG: 10 INJECTION, EMULSION INTRAVENOUS at 13:15

## 2017-01-04 RX ADMIN — MIDAZOLAM HYDROCHLORIDE 2 MG: 1 INJECTION, SOLUTION INTRAMUSCULAR; INTRAVENOUS at 13:05

## 2017-01-04 RX ADMIN — Medication 10 ML: at 05:28

## 2017-01-04 RX ADMIN — IBUPROFEN 800 MG: 400 TABLET ORAL at 21:07

## 2017-01-04 RX ADMIN — SODIUM CHLORIDE, SODIUM LACTATE, POTASSIUM CHLORIDE, AND CALCIUM CHLORIDE 25 ML/HR: 600; 310; 30; 20 INJECTION, SOLUTION INTRAVENOUS at 11:50

## 2017-01-04 RX ADMIN — DOXYCYCLINE 100 MG: 100 INJECTION, POWDER, LYOPHILIZED, FOR SOLUTION INTRAVENOUS at 03:31

## 2017-01-04 RX ADMIN — OXYCODONE HYDROCHLORIDE AND ACETAMINOPHEN 2 TABLET: 5; 325 TABLET ORAL at 21:07

## 2017-01-04 RX ADMIN — DOXYCYCLINE HYCLATE 100 MG: 100 TABLET, COATED ORAL at 15:53

## 2017-01-04 RX ADMIN — FENTANYL CITRATE 50 MCG: 50 INJECTION, SOLUTION INTRAMUSCULAR; INTRAVENOUS at 13:05

## 2017-01-04 RX ADMIN — CEFOXITIN SODIUM 2 G: 2 POWDER, FOR SOLUTION INTRAVENOUS at 01:46

## 2017-01-04 NOTE — PROGRESS NOTES
End of Shift Nursing Note    Bedside shift change report given to Keyur Galo RN (oncoming nurse) by Rhea Yeager RN (offgoing nurse). Report included the following information SBAR, Kardex, Intake/Output, MAR and Recent Results. Zone Phone:       Significant changes during shift:    Hypotensive, systolic in the low 43'H   Non-emergent issues for physician to address:   none     Number times ambulated in hallway past shift: 1      Number of times OOB to chair past shift: 0    POD #:      Vital Signs:    Temp: 97.5 °F (36.4 °C)     Pulse (Heart Rate): 63     BP: (!) 85/52     Resp Rate: 18     O2 Sat (%): 92 %    Lines & Drains:     Urinary Catheter? NO   Placement Date:    Medical Necessity:   Central Line? NO   Placement Date:    Medical Necessity:   PICC Line? NO   Placement Date:    Medical Necessity:     NG tube [] in [] removed [x] not applicable   Drains [] in [] removed [x] not applicable     Skin Integrity:      Wounds: no   Dressings Present: no    Wound Concerns: no      GI:    Current diet:  DIET NPO    Nausea: YES  Vomiting: YES  Bowel Sounds: YES  Flatus: YES  Last Bowel Movement: several days ago   Appearance:     Respiratory:  Supplemental O2: NO      Device:    via  Liters/min     Incentive Spirometer: YES  Volume:   Coughing and Deep Breathing: YES  Oral Care: YES  Understanding (patient/family education): YES   Getting out of bed: YES  Head of bed elevation: YES    Patient Safety:    Falls Score: 1  Mobility Score: 4  Bed Alarm On? NO  Sitter? NO      Opportunity for questions and clarification was given to oncoming nurse. Patient bed is in low position, side rails are up x 2, door & observation blinds open as needed, call bell within reach and patient not in distress.     Guzman Arias RN

## 2017-01-04 NOTE — PROGRESS NOTES
Pt is a 24 y.o  female admitted with Endometritis following abortive pregnancy. Pt was in bed resting with her step mother at the bedside. Demographic information verified and CM will update address. Pt lives with her mom in a 2 story home with steps. Prior to admission, pt was independent with her ADL's and IADL's and doesn't use any DME. Pt prefers the [employer] on [employer]. Pt drives but doesn't have a car at this time. Family will transport her home at discharge. CM will continue to follow for any discharge planning needs. Care Management Interventions  PCP Verified by CM: Yes Ferny Chun - sees often)  Mode of Transport at Discharge: Other (see comment) (family will transport)  Transition of Care Consult (CM Consult): Discharge Planning  Discharge Durable Medical Equipment: No  Physical Therapy Consult: No  Occupational Therapy Consult: No  Speech Therapy Consult: No  Current Support Network:  Other, Own Home (lives with her mom in a 2 story home with steps)  Confirm Follow Up Transport: Family  Discharge Location  Discharge Placement: [facility] [address]

## 2017-01-04 NOTE — PERIOP NOTES
Patient: Luis Short MRN: [id number]  SSN: [id number]   YOB: 1995  Age: 24 y.o. Sex: female     Patient is status post Procedure(s):  SUCTION DILATATION AND CURETTAGE (CHOICE ANESTHESIA). Surgeon(s) and Role:     * Ric Cabrera MD - Primary    Local/Dose/Irrigation:                    Peripheral IV 01/03/17 Right Antecubital (Active)   Site Assessment Clean, dry, & intact 1/4/2017  2:08 AM   Phlebitis Assessment 0 1/4/2017  2:08 AM   Infiltration Assessment 0 1/4/2017  2:08 AM   Dressing Status Clean, dry, & intact 1/4/2017  2:08 AM   Dressing Type Tape;Transparent 1/4/2017  2:08 AM   Hub Color/Line Status Pink; Infusing;Patent 1/4/2017  2:08 AM            Airway - Endotracheal Tube 01/04/17 Oral (Active)                   Dressing/Packing:  Wound Vagina Anterior-DRESSING TYPE: Cathy-pad (01/04/17 1300)  Splint/Cast:  ]    Other:

## 2017-01-04 NOTE — ANESTHESIA PREPROCEDURE EVALUATION
Anesthetic History     PONV (with epidurals/ childbirth. Has motion sickness)          Review of Systems / Medical History  Patient summary reviewed, nursing notes reviewed and pertinent labs reviewed    Pulmonary  Within defined limits                 Neuro/Psych         Psychiatric history    Comments: Depression  Anxiety  OCD Cardiovascular  Within defined limits                Exercise tolerance: >4 METS     GI/Hepatic/Renal  Within defined limits              Endo/Other  Within defined limits           Other Findings   Comments: RETAINED PRODUCTS OF CONCEPTION AND ENDOMETRITIS  ADHD         Physical Exam    Airway  Mallampati: I  TM Distance: 4 - 6 cm  Neck ROM: normal range of motion   Mouth opening: Normal     Cardiovascular  Regular rate and rhythm,  S1 and S2 normal,  no murmur, click, rub, or gallop  Rhythm: regular  Rate: normal      Pertinent negatives: No murmur   Dental  No notable dental hx       Pulmonary  Breath sounds clear to auscultation               Abdominal  GI exam deferred       Other Findings            Anesthetic Plan    ASA: 2  Anesthesia type: general and total IV anesthesia          Induction: Intravenous  Anesthetic plan and risks discussed with: Patient      MAC. Received zofran at 10 am & percocet at 9 am. Nauseated currently. Scopolamine patch preop.

## 2017-01-04 NOTE — PROGRESS NOTES
Patient asked to walk in the hallway. Advised patient to stay on this floor. This nurse and Dr. Deepa Espino visualized patient outside of the hospital smoking. Patient educated upon return to room that this is a non-smoking campus. Spoke with Dr. Deepa Espino - order for nicotine patch. Please see MAR. Patient agreeable to nicotine patch. Will continue to monitor.

## 2017-01-04 NOTE — PERIOP NOTES
Pt confused when woke her up but quickly oriented. States pain of 9 cramps but very groggy and back to sleep easily. Dr. Rhea Funez ordered Toradol IV and oral  medications for floor.    477 [facility] IVP for shakes and pain  1418 Report to [facility] and pt can't keep eyes open and VSS

## 2017-01-04 NOTE — ROUTINE PROCESS
Bedside shift change report given to Giovani Evans (oncoming nurse) by Apolonia Camargo RN (offgoing nurse). Report included the following information SBAR, Kardex, Intake/Output, MAR and Recent Results.     Zone Phone:      Significant changes during shift:  Hypotensive, systolic in the low 47'K   Non-emergent issues for physician to address: none      Number times ambulated in hallway past shift: 1      Number of times OOB to chair past shift: 0     POD #: 0 for D and C     Vital Signs:     Temp: 97.5 °F (36.4 °C)   Pulse (Heart Rate): 63   BP: (!) 85/52   Resp Rate: 18   O2 Sat (%): 92 %     Lines & Drains:     Urinary Catheter? NO  Placement Date:   Medical Necessity:   Central Line? NO  Placement Date:   Medical Necessity:   PICC Line? NO  Placement Date:   Medical Necessity:      NG tube [] in [] removed [x] not applicable   Drains [] in [] removed [x] not applicable      Skin Integrity:      Wounds: no   Dressings Present: no   Wound Concerns: no      GI:     Current diet: DIET NPO   Nausea: YES  Vomiting: YES  Bowel Sounds: YES  Flatus: YES  Last Bowel Movement: several days ago  Appearance:      Respiratory:  Supplemental O2: NO    Device:   via Liters/min      Incentive Spirometer: YES  Volume:   Coughing and Deep Breathing: YES  Oral Care: YES  Understanding (patient/family education): YES   Getting out of bed: YES  Head of bed elevation: YES     Patient Safety:     Falls Score: 1  Mobility Score: 4  Bed Alarm On? NO  Sitter? NO        Opportunity for questions and clarification was given to oncoming nurse.  Patient bed is in low position, side rails are up x 2, door & observation blinds open as needed, call bell within reach and patient not in distress.     Dick Swartz RN

## 2017-01-04 NOTE — ANESTHESIA POSTPROCEDURE EVALUATION
Post-Anesthesia Evaluation and Assessment    Patient: Brielle Patel MRN: [id number]  SSN: [id number]    YOB: 1995  Age: 24 y.o. Sex: female       Cardiovascular Function/Vital Signs  Visit Vitals    /76    Pulse 76    Temp 36.7 °C (98.1 °F)    Resp 20    Ht 5' 3\" (1.6 m)    Wt 63 kg (139 lb)    SpO2 100%    Breastfeeding No    BMI 24.62 kg/m2       Patient is status post general, total IV anesthesia anesthesia for Procedure(s):  SUCTION DILATATION AND CURETTAGE (CHOICE ANESTHESIA). Nausea/Vomiting: None    Postoperative hydration reviewed and adequate. Pain:  Pain Scale 1: FLACC (01/04/17 1355)  Pain Intensity 1: 0 (01/04/17 1355)   Managed    Neurological Status:   Neuro (WDL): Within Defined Limits (pt states she is a little dizzy; she has had percocet at 0900 this am) (01/04/17 1136)   At baseline    Mental Status and Level of Consciousness: Arousable    Pulmonary Status:   O2 Device: Room air (01/04/17 1355)   Adequate oxygenation and airway patent    Complications related to anesthesia: None    Post-anesthesia assessment completed.  No concerns    Signed By: Yumiko Higuera MD     January 4, 2017

## 2017-01-04 NOTE — PROGRESS NOTES
Gynecology Progress Note    Patient states she continues to have significant cramping, headache, and \"pain all over for 3 days. \"  Pain not adequately controlled on current medication. Voiding without difficulty. Vitals:  Blood pressure 97/55, pulse 89, temperature 97.6 °F (36.4 °C), resp. rate 16, last menstrual period 10/14/2016, SpO2 100 %, not currently breastfeeding. Temp (24hrs), Av.9 °F (36.6 °C), Min:97.6 °F (36.4 °C), Max:98.4 °F (36.9 °C)        Exam:  Patient without distress. Abdomen soft,  Mild tenderness both LQs; no RRG. Lower extremities are negative for swelling, cords, or tenderness. Labs:   Recent Results (from the past 24 hour(s))   METABOLIC PANEL, COMPREHENSIVE    Collection Time: 17  3:18 PM   Result Value Ref Range    Sodium 143 136 - 145 mmol/L    Potassium 3.5 3.5 - 5.1 mmol/L    Chloride 108 97 - 108 mmol/L    CO2 27 21 - 32 mmol/L    Anion gap 8 5 - 15 mmol/L    Glucose 83 65 - 100 mg/dL    BUN 13 6 - 20 MG/DL    Creatinine 0.77 0.55 - 1.02 MG/DL    BUN/Creatinine ratio 17 12 - 20      GFR est AA >60 >60 ml/min/1.73m2    GFR est non-AA >60 >60 ml/min/1.73m2    Calcium 8.3 (L) 8.5 - 10.1 MG/DL    Bilirubin, total 0.2 0.2 - 1.0 MG/DL    ALT 21 12 - 78 U/L    AST 12 (L) 15 - 37 U/L    Alk. phosphatase 59 45 - 117 U/L    Protein, total 7.1 6.4 - 8.2 g/dL    Albumin 3.3 (L) 3.5 - 5.0 g/dL    Globulin 3.8 2.0 - 4.0 g/dL    A-G Ratio 0.9 (L) 1.1 - 2.2     CBC WITH AUTOMATED DIFF    Collection Time: 17  3:18 PM   Result Value Ref Range    WBC 6.1 3.6 - 11.0 K/uL    RBC 3.60 (L) 3.80 - 5.20 M/uL    HGB 10.2 (L) 11.5 - 16.0 g/dL    HCT 31.7 (L) 35.0 - 47.0 %    MCV 88.1 80.0 - 99.0 FL    MCH 28.3 26.0 - 34.0 PG    MCHC 32.2 30.0 - 36.5 g/dL    RDW 13.6 11.5 - 14.5 %    PLATELET 349 412 - 642 K/uL    NEUTROPHILS 60 32 - 75 %    LYMPHOCYTES 31 12 - 49 %    MONOCYTES 8 5 - 13 %    EOSINOPHILS 1 0 - 7 %    BASOPHILS 0 0 - 1 %    ABS.  NEUTROPHILS 3.7 1.8 - 8.0 K/UL    ABS. LYMPHOCYTES 1.9 0.8 - 3.5 K/UL    ABS. MONOCYTES 0.5 0.0 - 1.0 K/UL    ABS. EOSINOPHILS 0.0 0.0 - 0.4 K/UL    ABS. BASOPHILS 0.0 0.0 - 0.1 K/UL   LACTIC ACID, PLASMA    Collection Time: 01/03/17  3:18 PM   Result Value Ref Range    Lactic acid 1.0 0.4 - 2.0 MMOL/L   TOTAL HCG, QT. Collection Time: 01/03/17  3:18 PM   Result Value Ref Range    HCG, Qt. 100 (H) 0 - 6 MIU/ML       Patient Active Problem List   Diagnosis Code    Endometritis following abortive pregnancy O03.5       Assessment and Plan: Will increase analgesia. Otherwise continue current care plan.     Arnaldo Zamora MD

## 2017-01-04 NOTE — PROGRESS NOTES
Pt is very lethargic and drowsy. Rn advised patient against going outside after procedure but was non-compliant.

## 2017-01-04 NOTE — ROUTINE PROCESS
TRANSFER - OUT REPORT:    Verbal report given to Carlyn 1690 on [employer]  being transferred to [facility] for routine progression of care       Report consisted of patients Situation, Background, Assessment and   Recommendations(SBAR). Information from the following report(s) SBAR, OR Summary, Procedure Summary, Intake/Output and MAR was reviewed with the receiving nurse. Opportunity for questions and clarification was provided.       Patient transported with:   Registered Nurse     [id number] Verified with Andrade Mejia RN about Scopolamine Patch

## 2017-01-04 NOTE — PERIOP NOTES
TRANSFER - IN REPORT:    Verbal report received from [facility]  being received from surgical (unit) for ordered procedure      Report consisted of patients Situation, Background, Assessment and   Recommendations(SBAR). Information from the following report(s) SBAR and MAR was reviewed with the receiving nurse. Opportunity for questions and clarification was provided. Assessment completed upon patients arrival to unit and care assumed. Patient admitted to pre-op via w/c. She has a #20g SL to the right AC. LR connected.

## 2017-01-04 NOTE — OP NOTES
SUCTION CURETTAGE FULL OP NOTE    Rosibel Doe  [id number]  1995      DATE OF PROCEDURE:  1/4/2017    PREOPERATIVE DIAGNOSIS:  RETAINED PRODUCTS OF CONCEPTION AND ENDOMETRITIS    POSTOPERATIVE DIAGNOSIS:  RETAINED PRODUCTS OF CONCEPTION AND ENDOMETRITIS    PROCEDURE: Procedure(s):  SUCTION DILATATION AND CURETTAGE (CHOICE ANESTHESIA)     SURGEON:  Ezra Ornelas MD    ASSISTANT:     ANESTHESIA:monitored anesthesia care    EBL: less than 50 ml    SPECIMENS: Products of conception    FINDINGS: 6-8wk size uterus  Scant bleeding, closed cervix. Small amount of tissue noted on suction D&C   Firm fundus post procedure     DESCRIPTION OF PROCEDURE: The patient was placed on the operating room table in the supine position and placed under general endotracheal anesthesia. Time out was done to confirm the operating procedure, surgeon, patient and site. Once confirmed by the team, procedure was started. PROCEDURE: Patient was placed on the operating table in the supine and after induction of anesthesia she was placed in the dorsal lithotomy position and prepped and draped in the usual fashion for vaginal surgery. Cervix was exposed with a weighted vaginal speculum and grasped with a single-tooth tenaculum. A curved 7 suction curette device was then introduced into the endometrial cavity after it was sounded to approximately 10 cm. Thorough suction curettage followed by sharp curettage with a large curette followed again by suction curettage was performed until the suction returned no further clot or products of conception. Adequate curettage was felt to be obtained. The uterus was massaged. Hemostasis appeared normal, Instruments were removed. The patient went to the recovery room in satisfactory condition. All counts were correct times two.   Of note blood type was RHpositive  declining bhcg preop 78 (down from 100 1/2, down from 123 1/3)

## 2017-01-04 NOTE — PROGRESS NOTES
Asked to see pt post op. She states she is sick from the oral abx and pain is no better.   Will return to IV abx and reassess in am as may not need further post op abx after tomorrowl

## 2017-01-04 NOTE — PERIOP NOTES
Yasmine Gordon  1995  [id number]    Situation:  Verbal report given from: DELIO Sullivan RN and KALYAN Ariza CRNA  Procedure: Procedure(s):  SUCTION DILATATION AND CURETTAGE (CHOICE ANESTHESIA)    Background:    Preoperative diagnosis: RETAINED PRODUCTS OF CONCEPTION AND ENDOMETRITIS    Postoperative diagnosis: RETAINED PRODUCTS OF CONCEPTION AND ENDOMETRITIS    :  Dr. Delonte Gillespie    Assistant(s): Circ-1: Ana Hodge RN  Scrub Tech-1: Lisseth Gall    Specimens:   ID Type Source Tests Collected by Time Destination   1 : RETAINED PRODUCTS OF CONCEPTION Preservative Uterus  Sonido Coppola MD 1/4/2017 1316 Pathology       Assessment:  Intra-procedure medications         Anesthesia gave intra-procedure sedation and medications, see anesthesia flow sheet     Intravenous fluids: LR@ KVO     Vital signs stable.  Pt has oral airway but deep breaths on own      Recommendation:    Permission to share finding with family or friend yes

## 2017-01-04 NOTE — PROGRESS NOTES
Gynecology Progress Note    Gustavo Davis    Assesment: Likely retained poc with endometritis. Plan: Will proceed with suction D&C and possible dc home on po abx vs continued monitoring in hospital if pain not improved. Pt c/o continued pain. Pain not controlled on current medication although pt was sleeping. Pt notes she is still passing clots although over all bleeding has slowed. Voiding without difficulty. Patient is passing flatus. She is NPO. Orders/Charges: Medium    Vitals:  Visit Vitals    /63    Pulse 73    Temp 97.5 °F (36.4 °C)    Resp 18    LMP 10/14/2016 (Approximate)    SpO2 92%    Breastfeeding No     Temp (24hrs), Av.9 °F (36.6 °C), Min:97.5 °F (36.4 °C), Max:98.4 °F (36.9 °C)      Last 24hr Input/Output:    Intake/Output Summary (Last 24 hours) at 17 0854  Last data filed at 17 1823   Gross per 24 hour   Intake              775 ml   Output                0 ml   Net              775 ml          Exam:  General: alert, cooperative, no distress, appears stated age         Abdomen: abdomen is soft but tender across entire lower abdomen.   No rebound  or guardingg     Extremities: extremities normal, atraumatic, no cyanosis or edema      Labs: Lab Results   Component Value Date/Time    WBC 6.0 2017 03:23 AM    WBC 6.1 2017 03:18 PM    WBC 8.5 2016 11:54 AM    WBC 8.5 2016 12:26 PM    WBC 6.3 2013 09:00 PM    WBC 7.8 2013 02:45 PM    WBC 14.6 2011 11:45 AM    WBC 8.4 2009 04:53 PM    HGB 9.0 2017 03:23 AM    HGB 10.2 2017 03:18 PM    HGB 15.1 2016 11:54 AM    HGB 14.9 2016 12:26 PM    HGB 11.4 2013 09:00 PM    HGB 12.7 2013 02:45 PM    HGB 12.6 2011 11:45 AM    HGB 13.6 2009 04:53 PM    HCT 27.9 2017 03:23 AM    HCT 31.7 2017 03:18 PM    HCT 45.1 2016 11:54 AM    HCT 45.2 2016 12:26 PM    HCT 33.6 2013 09:00 PM    HCT 38.9 2013 02:45 PM    HCT 36.8 11/04/2011 11:45 AM    HCT 40.6 09/07/2009 04:53 PM    PLATELET 162 65/22/8557 03:23 AM    PLATELET 414 13/66/6010 03:18 PM    PLATELET 206 80/29/3792 11:54 AM    PLATELET 780 72/97/1499 12:26 PM    PLATELET 238 37/85/7304 09:00 PM    PLATELET 760 99/26/2647 02:45 PM    PLATELET 114 61/76/0179 11:45 AM    PLATELET 422 83/04/5942 04:53 PM       Recent Results (from the past 24 hour(s))   METABOLIC PANEL, COMPREHENSIVE    Collection Time: 01/03/17  3:18 PM   Result Value Ref Range    Sodium 143 136 - 145 mmol/L    Potassium 3.5 3.5 - 5.1 mmol/L    Chloride 108 97 - 108 mmol/L    CO2 27 21 - 32 mmol/L    Anion gap 8 5 - 15 mmol/L    Glucose 83 65 - 100 mg/dL    BUN 13 6 - 20 MG/DL    Creatinine 0.77 0.55 - 1.02 MG/DL    BUN/Creatinine ratio 17 12 - 20      GFR est AA >60 >60 ml/min/1.73m2    GFR est non-AA >60 >60 ml/min/1.73m2    Calcium 8.3 (L) 8.5 - 10.1 MG/DL    Bilirubin, total 0.2 0.2 - 1.0 MG/DL    ALT 21 12 - 78 U/L    AST 12 (L) 15 - 37 U/L    Alk. phosphatase 59 45 - 117 U/L    Protein, total 7.1 6.4 - 8.2 g/dL    Albumin 3.3 (L) 3.5 - 5.0 g/dL    Globulin 3.8 2.0 - 4.0 g/dL    A-G Ratio 0.9 (L) 1.1 - 2.2     CBC WITH AUTOMATED DIFF    Collection Time: 01/03/17  3:18 PM   Result Value Ref Range    WBC 6.1 3.6 - 11.0 K/uL    RBC 3.60 (L) 3.80 - 5.20 M/uL    HGB 10.2 (L) 11.5 - 16.0 g/dL    HCT 31.7 (L) 35.0 - 47.0 %    MCV 88.1 80.0 - 99.0 FL    MCH 28.3 26.0 - 34.0 PG    MCHC 32.2 30.0 - 36.5 g/dL    RDW 13.6 11.5 - 14.5 %    PLATELET 795 264 - 616 K/uL    NEUTROPHILS 60 32 - 75 %    LYMPHOCYTES 31 12 - 49 %    MONOCYTES 8 5 - 13 %    EOSINOPHILS 1 0 - 7 %    BASOPHILS 0 0 - 1 %    ABS. NEUTROPHILS 3.7 1.8 - 8.0 K/UL    ABS. LYMPHOCYTES 1.9 0.8 - 3.5 K/UL    ABS. MONOCYTES 0.5 0.0 - 1.0 K/UL    ABS. EOSINOPHILS 0.0 0.0 - 0.4 K/UL    ABS.  BASOPHILS 0.0 0.0 - 0.1 K/UL   LACTIC ACID, PLASMA    Collection Time: 01/03/17  3:18 PM   Result Value Ref Range    Lactic acid 1.0 0.4 - 2.0 MMOL/L   TOTAL HCG, QT. Collection Time: 01/03/17  3:18 PM   Result Value Ref Range    HCG, Qt. 100 (H) 0 - 6 MIU/ML   CULTURE, BLOOD, PAIRED    Collection Time: 01/03/17  3:21 PM   Result Value Ref Range    Special Requests: NO SPECIAL REQUESTS      Culture result: NO GROWTH AFTER 14 HOURS     CBC WITH AUTOMATED DIFF    Collection Time: 01/04/17  3:23 AM   Result Value Ref Range    WBC 6.0 3.6 - 11.0 K/uL    RBC 3.15 (L) 3.80 - 5.20 M/uL    HGB 9.0 (L) 11.5 - 16.0 g/dL    HCT 27.9 (L) 35.0 - 47.0 %    MCV 88.6 80.0 - 99.0 FL    MCH 28.6 26.0 - 34.0 PG    MCHC 32.3 30.0 - 36.5 g/dL    RDW 13.6 11.5 - 14.5 %    PLATELET 203 953 - 590 K/uL    NEUTROPHILS 51 32 - 75 %    LYMPHOCYTES 41 12 - 49 %    MONOCYTES 7 5 - 13 %    EOSINOPHILS 1 0 - 7 %    BASOPHILS 0 0 - 1 %    ABS. NEUTROPHILS 3.1 1.8 - 8.0 K/UL    ABS. LYMPHOCYTES 2.5 0.8 - 3.5 K/UL    ABS. MONOCYTES 0.4 0.0 - 1.0 K/UL    ABS. EOSINOPHILS 0.1 0.0 - 0.4 K/UL    ABS. BASOPHILS 0.0 0.0 - 0.1 K/UL   TOTAL HCG, QT.     Collection Time: 01/04/17  3:23 AM   Result Value Ref Range    HCG, Qt. 78 (H) 0 - 6 MIU/ML

## 2017-01-04 NOTE — PROGRESS NOTES
Patient drowsy after returning from outside, unable to get out of wheelchair, slumped into bed. Informed patient and family member she is not to go outside again until she is fully awake. Patient non compliant, again went outside, found smoking by staff.

## 2017-01-04 NOTE — PROGRESS NOTES
Paged Dr. Sanjuana Starkey about patient request to take percocet PO after midnight (NPO after midnight) . Dr. Mcdonald Been called back and said it was ok to continue with Percocet PO with sips of clears after midnight.

## 2017-01-04 NOTE — PERIOP NOTES
Spoke with Dr. Anil Brown who stated to [address] is needed that she will proceed with D&C.  Called and spoke with Kenroy Mejia and advised we would be up to get her between 8 and 1015

## 2017-01-05 VITALS
HEART RATE: 66 BPM | HEIGHT: 63 IN | DIASTOLIC BLOOD PRESSURE: 61 MMHG | TEMPERATURE: 98.4 F | OXYGEN SATURATION: 99 % | WEIGHT: 139 LBS | RESPIRATION RATE: 18 BRPM | BODY MASS INDEX: 24.63 KG/M2 | SYSTOLIC BLOOD PRESSURE: 95 MMHG

## 2017-01-05 LAB
ANION GAP BLD CALC-SCNC: 9 MMOL/L (ref 5–15)
BACTERIA SPEC CULT: NORMAL
BASOPHILS # BLD AUTO: 0 K/UL (ref 0–0.1)
BASOPHILS # BLD: 1 % (ref 0–1)
BUN SERPL-MCNC: 9 MG/DL (ref 6–20)
BUN/CREAT SERPL: 13 (ref 12–20)
CALCIUM SERPL-MCNC: 7.8 MG/DL (ref 8.5–10.1)
CC UR VC: NORMAL
CHLORIDE SERPL-SCNC: 109 MMOL/L (ref 97–108)
CO2 SERPL-SCNC: 26 MMOL/L (ref 21–32)
CREAT SERPL-MCNC: 0.67 MG/DL (ref 0.55–1.02)
EOSINOPHIL # BLD: 0.1 K/UL (ref 0–0.4)
EOSINOPHIL NFR BLD: 2 % (ref 0–7)
ERYTHROCYTE [DISTWIDTH] IN BLOOD BY AUTOMATED COUNT: 13.6 % (ref 11.5–14.5)
GLUCOSE SERPL-MCNC: 101 MG/DL (ref 65–100)
HCT VFR BLD AUTO: 27.3 % (ref 35–47)
HGB BLD-MCNC: 8.8 G/DL (ref 11.5–16)
LYMPHOCYTES # BLD AUTO: 54 % (ref 12–49)
LYMPHOCYTES # BLD: 2.1 K/UL (ref 0.8–3.5)
MCH RBC QN AUTO: 28.7 PG (ref 26–34)
MCHC RBC AUTO-ENTMCNC: 32.2 G/DL (ref 30–36.5)
MCV RBC AUTO: 88.9 FL (ref 80–99)
MONOCYTES # BLD: 0.2 K/UL (ref 0–1)
MONOCYTES NFR BLD AUTO: 4 % (ref 5–13)
NEUTS SEG # BLD: 1.5 K/UL (ref 1.8–8)
NEUTS SEG NFR BLD AUTO: 39 % (ref 32–75)
PLATELET # BLD AUTO: 178 K/UL (ref 150–400)
POTASSIUM SERPL-SCNC: 3.8 MMOL/L (ref 3.5–5.1)
RBC # BLD AUTO: 3.07 M/UL (ref 3.8–5.2)
SERVICE CMNT-IMP: NORMAL
SODIUM SERPL-SCNC: 144 MMOL/L (ref 136–145)
WBC # BLD AUTO: 3.9 K/UL (ref 3.6–11)

## 2017-01-05 PROCEDURE — 74011250637 HC RX REV CODE- 250/637: Performed by: OBSTETRICS & GYNECOLOGY

## 2017-01-05 PROCEDURE — 85025 COMPLETE CBC W/AUTO DIFF WBC: CPT | Performed by: OBSTETRICS & GYNECOLOGY

## 2017-01-05 PROCEDURE — 74011000250 HC RX REV CODE- 250: Performed by: OBSTETRICS & GYNECOLOGY

## 2017-01-05 PROCEDURE — 74011250636 HC RX REV CODE- 250/636: Performed by: OBSTETRICS & GYNECOLOGY

## 2017-01-05 PROCEDURE — 74011250636 HC RX REV CODE- 250/636: Performed by: ANESTHESIOLOGY

## 2017-01-05 PROCEDURE — 80048 BASIC METABOLIC PNL TOTAL CA: CPT | Performed by: OBSTETRICS & GYNECOLOGY

## 2017-01-05 PROCEDURE — 36415 COLL VENOUS BLD VENIPUNCTURE: CPT | Performed by: OBSTETRICS & GYNECOLOGY

## 2017-01-05 RX ORDER — ONDANSETRON 4 MG/1
4 TABLET, ORALLY DISINTEGRATING ORAL
Qty: 15 TAB | Refills: 0 | Status: SHIPPED | OUTPATIENT
Start: 2017-01-05

## 2017-01-05 RX ORDER — IBUPROFEN 800 MG/1
800 TABLET ORAL 3 TIMES DAILY
Qty: 60 TAB | Refills: 1 | Status: SHIPPED | OUTPATIENT
Start: 2017-01-05

## 2017-01-05 RX ORDER — KETOROLAC TROMETHAMINE 30 MG/ML
30 INJECTION, SOLUTION INTRAMUSCULAR; INTRAVENOUS
Status: COMPLETED | OUTPATIENT
Start: 2017-01-05 | End: 2017-01-05

## 2017-01-05 RX ORDER — ACETAMINOPHEN AND CODEINE PHOSPHATE 300; 30 MG/1; MG/1
1 TABLET ORAL
Status: DISCONTINUED | OUTPATIENT
Start: 2017-01-05 | End: 2017-01-05 | Stop reason: HOSPADM

## 2017-01-05 RX ORDER — HYDROCODONE BITARTRATE AND ACETAMINOPHEN 5; 325 MG/1; MG/1
1 TABLET ORAL
Qty: 24 TAB | Refills: 0 | Status: SHIPPED | OUTPATIENT
Start: 2017-01-05

## 2017-01-05 RX ORDER — FAMOTIDINE 10 MG/ML
20 INJECTION INTRAVENOUS ONCE
Status: DISCONTINUED | OUTPATIENT
Start: 2017-01-05 | End: 2017-01-05

## 2017-01-05 RX ADMIN — ONDANSETRON 4 MG: 2 INJECTION INTRAMUSCULAR; INTRAVENOUS at 01:35

## 2017-01-05 RX ADMIN — FAMOTIDINE 20 MG: 10 INJECTION, SOLUTION INTRAVENOUS at 13:23

## 2017-01-05 RX ADMIN — ONDANSETRON 4 MG: 2 INJECTION INTRAMUSCULAR; INTRAVENOUS at 09:16

## 2017-01-05 RX ADMIN — OXYCODONE HYDROCHLORIDE AND ACETAMINOPHEN 2 TABLET: 5; 325 TABLET ORAL at 04:37

## 2017-01-05 RX ADMIN — OXYCODONE HYDROCHLORIDE AND ACETAMINOPHEN 2 TABLET: 5; 325 TABLET ORAL at 01:35

## 2017-01-05 RX ADMIN — SODIUM CHLORIDE, SODIUM LACTATE, POTASSIUM CHLORIDE, AND CALCIUM CHLORIDE 25 ML/HR: 600; 310; 30; 20 INJECTION, SOLUTION INTRAVENOUS at 07:02

## 2017-01-05 RX ADMIN — Medication 10 ML: at 05:04

## 2017-01-05 RX ADMIN — KETOROLAC TROMETHAMINE 30 MG: 30 INJECTION, SOLUTION INTRAMUSCULAR at 13:20

## 2017-01-05 RX ADMIN — OXYCODONE HYDROCHLORIDE AND ACETAMINOPHEN 2 TABLET: 5; 325 TABLET ORAL at 09:16

## 2017-01-05 RX ADMIN — ACETAMINOPHEN AND CODEINE PHOSPHATE 1 TABLET: 300; 30 TABLET ORAL at 14:13

## 2017-01-05 RX ADMIN — IBUPROFEN 800 MG: 400 TABLET ORAL at 14:13

## 2017-01-05 NOTE — DISCHARGE INSTRUCTIONS
After Care Instructions For Your D&C      1. You may resume your usual diet once the nausea resolves. Initially, try sips of warm fluids and a bland diet. 2. Avoid heavy lifting and straining. Gradually increase your activity. First, try walking and doing light activity around the house. Resume your normal habits if no significant discomfort or bleeding develops. Most women can return to work within one to four days after this procedure. 3. You may take showers. Avoid using a tub bath, swimming pool or hot tub until after your check-up. 4. Do not place anything in your vagina until after your postoperative visit. Do not   douche, use tampons, or have intercourse because this may cause bleeding and   infection. 5. You may initially experience a heavy bloody discharge. This should not be more than your menstrual flow. Over the next several days, the flow should steadily decrease. 6. Typically following the procedure, there is little or no pain. You may feel cramps in your lower abdomen. Tylenol may relieve mild cramping. If pain medication does not improve your symptoms, you should contact your physician. 7. Contact the office if you have excessive bleeding (saturating a pad an hour for two hours or passing large clots). It is also necessary to speak with your physician if you develop chills, a temperature greater than 100.4, difficulty voiding or burning on urination. 8. Your physician may want to see you in the office after your D&C. Please call for an appointment if this has not already been arranged. Our office phone number is [phone]. If appropriate, the microscopic results from your procedure will be discussed at this follow-up visit.

## 2017-01-05 NOTE — PROGRESS NOTES
End of Shift Nursing Note    Bedside shift change report given to David Parr (oncoming nurse) by Roxie Hernandez (offgoing nurse). Report included the following information SBAR, Kardex, Procedure Summary and Recent Results. Zone Phone:   [phone]    Significant changes during shift:    none   Non-emergent issues for physician to address:   none     Number times ambulated in hallway past shift: up ad christine      Number of times OOB to chair past shift: up ad christine    POD #: 0     Vital Signs:    Temp: 97.5 °F (36.4 °C)     Pulse (Heart Rate): 77     BP: 110/59     Resp Rate: 18     O2 Sat (%): 100 %    Lines & Drains:     Urinary Catheter? NO   Placement Date:    Medical Necessity:   Central Line? NO   Placement Date:    Medical Necessity:   PICC Line? NO   Placement Date:    Medical Necessity:     NG tube [] in [] removed [x] not applicable   Drains [] in [] removed [x] not applicable     Skin Integrity:      Wounds: no   Dressings Present: no    Wound Concerns: no      GI:    Current diet:  DIET REGULAR    Nausea: NO  Vomiting: NO  Bowel Sounds: YES  Flatus: YES  Last Bowel Movement: yesterday   Appearance:     Respiratory:  Supplemental O2: NO      Device:    via  Liters/min     Incentive Spirometer: NO  Volume:   Coughing and Deep Breathing: YES  Oral Care: NO  Understanding (patient/family education): YES   Getting out of bed: YES  Head of bed elevation: YES    Patient Safety:    Falls Score: 1  Mobility Score: 1  Bed Alarm On? NO  Sitter? NO      Opportunity for questions and clarification was given to oncoming nurse. Patient bed is in low position, side rails are up x 2, door & observation blinds open as needed, call bell within reach and patient not in distress.     Marion Plascencia RN

## 2017-01-05 NOTE — PROGRESS NOTES
Met with pt and she was aware of her discharge. Pt's father will transport her home. Pt is aware of her f/u appointments and will make them when she gets home.      Jany Mann, [id number]

## 2017-01-05 NOTE — PROGRESS NOTES
In to see patient,   She complains that still unable to urinate. Nurse at bedside with bladder scan PAtient also still reports headache. Throbbing sensation. No further vomiting but states she has not eaten. Although empty salad bowl with fork ad empty ginger ale can at bedside. Per nurse patient has not had any visitors. Advised her labs were normal.   Bladder scan 264-320cc  Benign pelvic exam Although some tenderness at bladder   Advised patient we can either straight cath or attempt void. She got out of bed and ambulated to bathroom for void of approx 100cc clear urine. Not concentrated. No odor  Per nurse patient tolerated both PO ibuprofen and percocet. Patient was requesting 'scan of my head because this headache will not go away'      Discussed with patient normal exam findings. Likely headache related to fatigue and dehydration with Poor PO intake. No evidence of further infection.  No indication for further hospitalization   Will plan discharge home with Ibuprofen, Percocet and zofran   She is to arrange follow-up with her PCP if no resolution of headache   No further complication from SAB, now s/p adequate treatment with antibiotics and D&C   Follow-up in our office in 2 weeks  Will discuss contraception at that time

## 2017-01-05 NOTE — PROGRESS NOTES
Discharge paperwork signed and given, copy placed on chart. Patient left via wheelchair accompanied by tech and family member.

## 2017-01-05 NOTE — PROGRESS NOTES
Gynecology Post Operative Note    [employer]    Assessment: Repots not feeling well  Unable to tolerate PO  States has not voided since yesterday evening. Changing a pad every 3-4 hours, still passing clots. Reports bladder scan last night 'not much in there' Reports vomited after breakfast and Percocet is making her sick   Asking'why they changed me from Dilaudid that was working'   Spoke with nurse, report given at shift this am. Overnight bladder scan for 125cc  Increased fluids this mornign to 150cc/hr. To her knowledge patient has not been up to void repeat bladder scan with 200cc urine noted. Per nurse no pads in room or in trash. Nothing to indicate that julian has had continued heavy bleeding  Afebrile   Nurse reports patient up ad off unti last night   Also asked patient about being outside for walk and talking with her friend, having cigarette yesterday after surgery \"didn't start feeling bad until the middle of the night, I think I pushed myself too hard\"  Urine culture and blood culture from admission both negative   pathology reviewed from [facility] , necrotic decidua  No further evidence of infection Stopped antibiotics last night     Plan: Continue routine post-op care  Will continue IV fluids. Repeat bladder scan  In 2 hours if no void. Check labs CBC, BMP  Urine drug screen in able to void   Gi lite diet   IV toradol 1 dose now. IV pepcid x 1 dose     Change percocet to Tylenol#3 as this seems to be worsening her nausea   Advised if normal labs then plan would be for discahrge home     Post-op day 1 from Procedure(s):  SUCTION DILATATION AND CURETTAGE (CHOICE ANESTHESIA)  She is feeling poorly . Pain is not controlled on current medication. Voiding not documented. Patient is passing flatus. She is is not tolerating her diet.   Concern is that there is some secondary gain to her remaining in the hospital As her complaints are disproportionate to her exam     Orders/Charges: n/a    Vitals:  Visit Vitals    BP 95/61 (BP 1 Location: Left arm, BP Patient Position: At rest)    Pulse 66    Temp 98.4 °F (36.9 °C)    Resp 18    Ht 5' 3\" (1.6 m)    Wt 63 kg (139 lb)    LMP 10/14/2016 (Approximate)    SpO2 99%    Breastfeeding No    BMI 24.62 kg/m2     Temp (24hrs), Av.7 °F (36.5 °C), Min:97.2 °F (36.2 °C), Max:98.4 °F (36.9 °C)      Last 24hr Input/Output:    Intake/Output Summary (Last 24 hours) at 17 1210  Last data filed at 17 0745   Gross per 24 hour   Intake           997.92 ml   Output              100 ml   Net           897.92 ml          Exam:  Patient without distress. Lungs clear              Abdomen soft, bowel sounds present, no fundal tenderness. Lower extremities are negative for swelling, cords, or tenderness.     Labs: Lab Results   Component Value Date/Time    WBC 6.0 2017 03:23 AM    WBC 6.1 2017 03:18 PM    WBC 8.5 2016 11:54 AM    WBC 8.5 2016 12:26 PM    WBC 6.3 2013 09:00 PM    WBC 7.8 2013 02:45 PM    WBC 14.6 2011 11:45 AM    WBC 8.4 2009 04:53 PM    HGB 9.0 2017 03:23 AM    HGB 10.2 2017 03:18 PM    HGB 15.1 2016 11:54 AM    HGB 14.9 2016 12:26 PM    HGB 11.4 2013 09:00 PM    HGB 12.7 2013 02:45 PM    HGB 12.6 2011 11:45 AM    HGB 13.6 2009 04:53 PM    HCT 27.9 2017 03:23 AM    HCT 31.7 2017 03:18 PM    HCT 45.1 2016 11:54 AM    HCT 45.2 2016 12:26 PM    HCT 33.6 2013 09:00 PM    HCT 38.9 2013 02:45 PM    HCT 36.8 2011 11:45 AM    HCT 40.6 2009 04:53 PM    PLATELET 840  03:23 AM    PLATELET 717  03:18 PM    PLATELET 305  11:54 AM    PLATELET 983  12:26 PM    PLATELET 336  09:00 PM    PLATELET 452  02:45 PM    PLATELET 457  11:45 AM    PLATELET 721 10/40/8099 04:53 PM       No results found for this or any previous visit (from the past 24 hour(s)).

## 2017-01-05 NOTE — PROGRESS NOTES
patient requests dose of pain medication prior to discharge \"car ride usually makes my pain worse\" [facility] to give prior to discharge    Advised patient to fill RX on way home.  [facility] on [address] is closest pharmacy and can have family fill RX there if she desires

## 2017-01-05 NOTE — DISCHARGE SUMMARY
Gynecology Discharge Summary     Patient ID:  Mercedes   [id number]  13 y.o.  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 Patient Active Problem List   Diagnosis Code    Endometritis following abortive pregnancy O03.5       Procedures for this admission: Procedure(s):  SUCTION DILATATION AND CURETTAGE (CHOICE ANESTHESIA)    Hospital Course: Admitted with presumed endometritis from retained POC following SAB at home 2 weeks prior. Approx 6 weeks gestation at time of SAB. Declining bhcg value at time of admission. Treated with IV antibiotics followed by suction D&C  Path consistent with necrotic decidua. Minimal bleeding postop. Nontender fundus. Postop course complicated by patients complaint of general malaise, vomiting, and inability to void. Although exam not consistent with her subjective complaint. Voided 100cc clear urine prior to discharge. Normal labs including CBC, BMP. Normal renal function   Urine and blood cultures both negative. Patient was afebrile with normal vital signes throughout her admission. Some concern by nursing and MD staff for malingering although patient denied any secondary gain when questioned directly Stable for discharge home, although she was unhappy with this plan    Disposition: Home or self care    Discharged Condition: good            Patient Instructions:   Current Discharge Medication List      START taking these medications    Details   ibuprofen (MOTRIN) 800 mg tablet Take 1 Tab by mouth three (3) times daily. Qty: 60 Tab, Refills: 1      ondansetron (ZOFRAN ODT) 4 mg disintegrating tablet Take 1 Tab by mouth every eight (8) hours as needed for Nausea.   Qty: 15 Tab, Refills: 0         CONTINUE these medications which have CHANGED    Details   HYDROcodone-acetaminophen (NORCO) 5-325 mg per tablet Take 1 Tab by mouth every four (4) hours as needed for Pain. Max Daily Amount: 6 Tabs. Qty: 24 Tab, Refills: 0         CONTINUE these medications which have NOT CHANGED    Details   citalopram (CELEXA) 10 mg tablet Take  by mouth daily. acetaminophen (TYLENOL) 325 mg tablet Take 2 Tabs by mouth every six (6) hours as needed for Pain. Qty: 20 Tab, Refills: 0           Activity: Activity as tolerated, No sex for 6 weeks and No driving while on analgesics  Diet: Regular Diet, Increase noncaffeinated fluids and Encourage fluids  Wound Care: None needed    Follow-up with Dr Narciso Coronado in 2 weeks.   If your headache and general malaise persists  Please call for follow-up with your Primary care doctor   Signed:  Deb Najera MD  1/5/2017  4:12 PM

## 2017-01-08 LAB
BACTERIA SPEC CULT: NORMAL
SERVICE CMNT-IMP: NORMAL

## 2018-10-26 ENCOUNTER — HOSPITAL ENCOUNTER (OUTPATIENT)
Dept: ULTRASOUND IMAGING | Age: 23
Discharge: HOME | End: 2018-10-26
Attending: OTOLARYNGOLOGY
Payer: COMMERCIAL

## 2018-10-26 DIAGNOSIS — R22.1 NECK MASS: ICD-10-CM

## 2018-10-26 PROCEDURE — 76536 US EXAM OF HEAD AND NECK: CPT

## 2018-12-21 ENCOUNTER — HOSPITAL ENCOUNTER (OUTPATIENT)
Dept: ULTRASOUND IMAGING | Age: 23
Discharge: HOME | End: 2018-12-21
Attending: OTOLARYNGOLOGY
Payer: COMMERCIAL

## 2018-12-21 DIAGNOSIS — R22.1 NECK MASS: ICD-10-CM

## 2018-12-21 PROCEDURE — 77030014115

## 2018-12-21 PROCEDURE — 88172 CYTP DX EVAL FNA 1ST EA SITE: CPT

## 2018-12-21 PROCEDURE — 10022 US GUIDE FINE NDL ASP W IMAGE: CPT

## 2018-12-21 PROCEDURE — 88173 CYTOPATH EVAL FNA REPORT: CPT

## 2018-12-21 RX ORDER — LIDOCAINE HYDROCHLORIDE 10 MG/ML
5 INJECTION, SOLUTION EPIDURAL; INFILTRATION; INTRACAUDAL; PERINEURAL
Status: DISPENSED | OUTPATIENT
Start: 2018-12-21 | End: 2018-12-21

## 2018-12-21 NOTE — DISCHARGE INSTRUCTIONS
[address] [facility]  Department of Radiology  [phone] Ultrasound Department  [phone] Emergency Department    BIOPSY DISCHARGE INSTRUCTIONS for Tim Chowdhury on Friday December 21, 2018     General Instructions:  - A biopsy is the removal of a small piece of tissue for microscopic examination or testing.  - Healthy tissue can be obtained for the purpose of tissue-type matching for transplants. - Unhealthy tissues are more commonly biopsied to diagnose disease. General Biopsy:  - A mass can grow in any area of the body, and we are taking a specimen as ordered by your doctor. - The risks are the same. They include bleeding, pain, and infection. Home Care Instructions:  - You may resume your regular diet and medication regimen. - You may use over the counter acetaminophen (Tylenol) or ibuprofen (Advil) for the soreness. - You may apply an ice pack to the affected area for 20-30 minutes at time for the first 24 hours. -- After that, you may apply a heat pack. - You may remove the bandaid tonight. - You may take a shower tonight. - Please keep the site clean and dry for 24-48 hours. - Do not soak in any kind of water (bath tub, hot tub, pool, ocean, etc) for 24-48 hours. - Do not participate in any kind of activity making you vigorously sweat for 24-48 hours. - Do not use any moisturizer or makeup on the site for 24-48 hours. Call If:  - You should call your Physician if you have any questions or concerns about the biopsy site. - Call if you should have increased pain, fever, redness, drainage, or bleeding more than a small spot on the bandage. Follow-Up Instructions:  - Please see your ordering doctor as she has requested.

## 2019-06-13 ENCOUNTER — APPOINTMENT (OUTPATIENT)
Dept: CT IMAGING | Age: 24
End: 2019-06-13
Attending: PHYSICIAN ASSISTANT
Payer: COMMERCIAL

## 2019-06-13 ENCOUNTER — HOSPITAL ENCOUNTER (EMERGENCY)
Age: 24
Discharge: HOME | End: 2019-06-13
Attending: STUDENT IN AN ORGANIZED HEALTH CARE EDUCATION/TRAINING PROGRAM
Payer: COMMERCIAL

## 2019-06-13 VITALS
OXYGEN SATURATION: 98 % | SYSTOLIC BLOOD PRESSURE: 129 MMHG | TEMPERATURE: 98.1 F | HEART RATE: 89 BPM | RESPIRATION RATE: 16 BRPM | DIASTOLIC BLOOD PRESSURE: 76 MMHG

## 2019-06-13 DIAGNOSIS — R20.0 HAND NUMBNESS: Primary | ICD-10-CM

## 2019-06-13 DIAGNOSIS — R51.9 NONINTRACTABLE HEADACHE, UNSPECIFIED CHRONICITY PATTERN, UNSPECIFIED HEADACHE TYPE: ICD-10-CM

## 2019-06-13 DIAGNOSIS — R20.0 BILATERAL LEG NUMBNESS: ICD-10-CM

## 2019-06-13 LAB
ANION GAP BLD CALC-SCNC: 16 MMOL/L (ref 10–20)
BUN BLD-MCNC: 9 MG/DL (ref 9–20)
CA-I BLD-MCNC: 1.17 MMOL/L (ref 1.12–1.32)
CHLORIDE BLD-SCNC: 104 MMOL/L (ref 98–107)
CO2 BLD-SCNC: 27 MMOL/L (ref 21–32)
CREAT BLD-MCNC: 0.8 MG/DL (ref 0.6–1.3)
GLUCOSE BLD-MCNC: 78 MG/DL (ref 65–100)
HCT VFR BLD CALC: 44 % (ref 35–47)
POTASSIUM BLD-SCNC: 3.7 MMOL/L (ref 3.5–5.1)
SERVICE CMNT-IMP: NORMAL
SODIUM BLD-SCNC: 142 MMOL/L (ref 136–145)

## 2019-06-13 PROCEDURE — 96375 TX/PRO/DX INJ NEW DRUG ADDON: CPT

## 2019-06-13 PROCEDURE — 80047 BASIC METABLC PNL IONIZED CA: CPT

## 2019-06-13 PROCEDURE — 74011250636 HC RX REV CODE- 250/636: Performed by: PHYSICIAN ASSISTANT

## 2019-06-13 PROCEDURE — 74011250637 HC RX REV CODE- 250/637: Performed by: PHYSICIAN ASSISTANT

## 2019-06-13 PROCEDURE — 70450 CT HEAD/BRAIN W/O DYE: CPT

## 2019-06-13 PROCEDURE — 99283 EMERGENCY DEPT VISIT LOW MDM: CPT

## 2019-06-13 PROCEDURE — 96374 THER/PROPH/DIAG INJ IV PUSH: CPT

## 2019-06-13 PROCEDURE — 96361 HYDRATE IV INFUSION ADD-ON: CPT

## 2019-06-13 RX ORDER — ACETAMINOPHEN 325 MG/1
650 TABLET ORAL
Status: COMPLETED | OUTPATIENT
Start: 2019-06-13 | End: 2019-06-13

## 2019-06-13 RX ORDER — DIPHENHYDRAMINE HYDROCHLORIDE 50 MG/ML
25 INJECTION, SOLUTION INTRAMUSCULAR; INTRAVENOUS
Status: DISCONTINUED | OUTPATIENT
Start: 2019-06-13 | End: 2019-06-14 | Stop reason: HOSPADM

## 2019-06-13 RX ORDER — PROCHLORPERAZINE EDISYLATE 5 MG/ML
10 INJECTION INTRAMUSCULAR; INTRAVENOUS
Status: DISCONTINUED | OUTPATIENT
Start: 2019-06-13 | End: 2019-06-13 | Stop reason: CLARIF

## 2019-06-13 RX ORDER — SODIUM CHLORIDE 9 MG/ML
1000 INJECTION, SOLUTION INTRAVENOUS ONCE
Status: COMPLETED | OUTPATIENT
Start: 2019-06-13 | End: 2019-06-13

## 2019-06-13 RX ORDER — KETOROLAC TROMETHAMINE 30 MG/ML
30 INJECTION, SOLUTION INTRAMUSCULAR; INTRAVENOUS
Status: COMPLETED | OUTPATIENT
Start: 2019-06-13 | End: 2019-06-13

## 2019-06-13 RX ADMIN — SODIUM CHLORIDE 1000 ML/HR: 900 INJECTION, SOLUTION INTRAVENOUS at 21:25

## 2019-06-13 RX ADMIN — KETOROLAC TROMETHAMINE 30 MG: 30 INJECTION, SOLUTION INTRAMUSCULAR at 21:25

## 2019-06-13 RX ADMIN — ACETAMINOPHEN 650 MG: 325 TABLET ORAL at 21:33

## 2019-06-14 NOTE — DISCHARGE INSTRUCTIONS
Patient Education        Headache: Care Instructions  Your Care Instructions    Headaches have many possible causes. Most headaches aren't a sign of a more serious problem, and they will get better on their own. Home treatment may help you feel better faster. The doctor has checked you carefully, but problems can develop later. If you notice any problems or new symptoms, get medical treatment right away. Follow-up care is a key part of your treatment and safety. Be sure to make and go to all appointments, and call your doctor if you are having problems. It's also a good idea to know your test results and keep a list of the medicines you take. How can you care for yourself at home? · Do not drive if you have taken a prescription pain medicine. · Rest in a quiet, dark room until your headache is gone. Close your eyes and try to relax or go to sleep. Don't watch TV or read. · Put a cold, moist cloth or cold pack on the painful area for 10 to 20 minutes at a time. Put a thin cloth between the cold pack and your skin. · Use a warm, moist towel or a heating pad set on low to relax tight shoulder and neck muscles. · Have someone gently massage your neck and shoulders. · Take pain medicines exactly as directed. ? If the doctor gave you a prescription medicine for pain, take it as prescribed. ? If you are not taking a prescription pain medicine, ask your doctor if you can take an over-the-counter medicine. · Be careful not to take pain medicine more often than the instructions allow, because you may get worse or more frequent headaches when the medicine wears off. · Do not ignore new symptoms that occur with a headache, such as a fever, weakness or numbness, vision changes, or confusion. These may be signs of a more serious problem. To prevent headaches  · Keep a headache diary so you can figure out what triggers your headaches. Avoiding triggers may help you prevent headaches.  Record when each headache began, how long it lasted, and what the pain was like (throbbing, aching, stabbing, or dull). Write down any other symptoms you had with the headache, such as nausea, flashing lights or dark spots, or sensitivity to bright light or loud noise. Note if the headache occurred near your period. List anything that might have triggered the headache, such as certain foods (chocolate, cheese, wine) or odors, smoke, bright light, stress, or lack of sleep. · Find healthy ways to deal with stress. Headaches are most common during or right after stressful times. Take time to relax before and after you do something that has caused a headache in the past.  · Try to keep your muscles relaxed by keeping good posture. Check your jaw, face, neck, and shoulder muscles for tension, and try relaxing them. When sitting at a desk, change positions often, and stretch for 30 seconds each hour. · Get plenty of sleep and exercise. · Eat regularly and well. Long periods without food can trigger a headache. · Treat yourself to a massage. Some people find that regular massages are very helpful in relieving tension. · Limit caffeine by not drinking too much coffee, tea, or soda. But don't quit caffeine suddenly, because that can also give you headaches. · Reduce eyestrain from computers by blinking frequently and looking away from the computer screen every so often. Make sure you have proper eyewear and that your monitor is set up properly, about an arm's length away. · Seek help if you have depression or anxiety. Your headaches may be linked to these conditions. Treatment can both prevent headaches and help with symptoms of anxiety or depression. When should you call for help? Call 911 anytime you think you may need emergency care. For example, call if:    · You have signs of a stroke. These may include:  ? Sudden numbness, paralysis, or weakness in your face, arm, or leg, especially on only one side of your body.   ? Sudden vision changes. ? Sudden trouble speaking. ? Sudden confusion or trouble understanding simple statements. ? Sudden problems with walking or balance. ? A sudden, severe headache that is different from past headaches.    Call your doctor now or seek immediate medical care if:    · You have a new or worse headache.     · Your headache gets much worse. Where can you learn more? Go to http://elizabeth-corrine.info/. Enter M271 in the search box to learn more about \"Headache: Care Instructions. \"  Current as of: Adri 3, 2018  Content Version: 11.9  © 2146-0155 [employer]. Care instructions adapted under license by [employer] (which disclaims liability or warranty for this information). If you have questions about a medical condition or this instruction, always ask your healthcare professional. [place] [address] any warranty or liability for your use of this information. Patient Education        Numbness and Tingling: Care Instructions  Your Care Instructions    Many things can cause numbness or tingling. Swelling may put pressure on a nerve. This could cause you to lose feeling or have a pins-and-needles sensation on part of your body. Nerves may be damaged from trauma, toxins, or diseases, such as diabetes or multiple sclerosis (MS). Sometimes, though, the cause is not clear. If there is no clear reason for your symptoms, and you are not having any other symptoms, your doctor may suggest watching and waiting for a while to see if the numbness or tingling goes away on its own. Your doctor may want you to have blood or nerve tests to find the cause of your symptoms. Follow-up care is a key part of your treatment and safety. Be sure to make and go to all appointments, and call your doctor if you are having problems. It's also a good idea to know your test results and keep a list of the medicines you take. How can you care for yourself at home?   · If your doctor prescribes medicine, take it exactly as directed. Call your doctor if you think you are having a problem with your medicine. · If you have any swelling, put ice or a cold pack on the area for 10 to 20 minutes at a time. Put a thin cloth between the ice and your skin. When should you call for help? Call 911 anytime you think you may need emergency care. For example, call if:    · You have weakness, numbness, or tingling in both legs.     · You lose bowel or bladder control.     · You have symptoms of a stroke. These may include:  ? Sudden numbness, tingling, weakness, or loss of movement in your face, arm, or leg, especially on only one side of your body. ? Sudden vision changes. ? Sudden trouble speaking. ? Sudden confusion or trouble understanding simple statements. ? Sudden problems with walking or balance. ? A sudden, severe headache that is different from past headaches.    Watch closely for changes in your health, and be sure to contact your doctor if you have any problems, or if:    · You do not get better as expected. Where can you learn more? Go to http://elizabeth-corrine.info/. Enter X270 in the search box to learn more about \"Numbness and Tingling: Care Instructions. \"  Current as of: Adri 3, 2018  Content Version: 11.9  © 9833-3273 [employer]. Care instructions adapted under license by [employer] (which disclaims liability or warranty for this information). If you have questions about a medical condition or this instruction, always ask your healthcare professional. [place] [address] any warranty or liability for your use of this information.

## 2019-06-14 NOTE — ED PROVIDER NOTES
24 y.o. female with no significant past medical history who presents from home via personal vehicle with chief complaint of numbness. Pt presents in the emergency room c/o whole body numbness and tingling with special note of hands, feet, legs, and the right side of her abdomen. Pt denies any injury or neck pain. Pt's  states that the patient has been c/o numbness in her heels that \"shoots up the back of her legs. \" Pt also c/o swelling in her hands. In addition, pt c/o a headache which she localizes to the anterior and posterior portion of her head. Pt states she was seen by her PCP who did a Rheumatoid arthritis test which she reports came back positive. Pt states that she was instructed to see a neurologist but was unable to get an appointment sooner than August. Pt affirms numbness, tingling, swelling, headache, and frequency. Pt denies chest pain. Pt states she was prescribed a prescription anti-inflammatory. Pt reports currently being on her period. There are no other acute medical concerns at this time. Social hx: No Alcohol use. Smoked 0.5 pack per day. No drug use. PCP: EARLENE Tanner    Note written by leo Siddiqui, as dictated by  8:18 PM      The history is provided by the patient. No  was used.         Past Medical History:   Diagnosis Date    ADHD (attention deficit hyperactivity disorder)     Psychiatric disorder     ADHD, OCD    Psychiatric problem     wellbutrin about 1 yr ago for anxiety    Psychiatric problem     adhd    Psychiatric problem     depression    Psychiatric problem     anxiety       Past Surgical History:   Procedure Laterality Date    HX GYN  01/04/2017    SUCTION DILATATION AND CURETTAGE (CHOICE ANESTHESIA         Family History:   Problem Relation Age of Onset    Hypertension Father     Cancer Maternal Grandfather     Cancer Paternal Grandfather     Diabetes Maternal Aunt        Social History     Socioeconomic History  Marital status: SINGLE     Spouse name: Not on file    Number of children: Not on file    Years of education: Not on file    Highest education level: Not on file   Occupational History    Not on file   Social Needs    Financial resource strain: Not on file    Food insecurity:     Worry: Not on file     Inability: Not on file    Transportation needs:     Medical: Not on file     Non-medical: Not on file   Tobacco Use    Smoking status: Current Every Day Smoker     Packs/day: 0.50    Smokeless tobacco: Never Used    Tobacco comment: quit during pregnancy   Substance and Sexual Activity    Alcohol use: Yes     Comment: rarely    Drug use: No    Sexual activity: Yes     Partners: Male     Birth control/protection: Condom   Lifestyle    Physical activity:     Days per week: Not on file     Minutes per session: Not on file    Stress: Not on file   Relationships    Social connections:     Talks on phone: Not on file     Gets together: Not on file     Attends Quaker service: Not on file     Active member of club or organization: Not on file     Attends meetings of clubs or organizations: Not on file     Relationship status: Not on file    Intimate partner violence:     Fear of current or ex partner: Not on file     Emotionally abused: Not on file     Physically abused: Not on file     Forced sexual activity: Not on file   Other Topics Concern    Not on file   Social History Narrative    Not on file         ALLERGIES: Prednisone and Tramadol    Review of Systems   Cardiovascular: Negative for chest pain. Gastrointestinal: Positive for abdominal pain (right sided numbness). Genitourinary: Positive for frequency. Musculoskeletal: Positive for arthralgias (Knee pain). Negative for neck pain. Swelling in her fingers. Neurological: Positive for numbness ( hands, feet, legs) and headaches.        Vitals:    06/13/19 1948 06/13/19 2021   BP:  134/86   Pulse: 95 93   Resp:  17   Temp:  98.3 °F (36.8 °C)   SpO2: 99% 96%            Physical Exam   Constitutional: She is oriented to person, place, and time. She appears well-developed and well-nourished. No distress. Well appearing  female in NAD   HENT:   Head: Normocephalic and atraumatic. Right Ear: External ear normal.   Left Ear: External ear normal.   Nose: Nose normal.   Mouth/Throat: Oropharynx is clear and moist. No oropharyngeal exudate. Eyes: Pupils are equal, round, and reactive to light. Conjunctivae are normal.   Neck: Normal range of motion. Neck supple. Cardiovascular: Normal rate, regular rhythm and normal heart sounds. Pulmonary/Chest: Effort normal. No respiratory distress. She has no wheezes. Abdominal: Soft. Bowel sounds are normal. She exhibits no distension. There is no tenderness. There is no rebound. Musculoskeletal: Normal range of motion. Neurological: She is alert and oriented to person, place, and time. She displays normal reflexes. No cranial nerve deficit. She exhibits normal muscle tone. Coordination normal.   Skin: Skin is warm and dry. No ecchymosis, no laceration and no lesion noted. Nursing note and vitals reviewed. MDM  Number of Diagnoses or Management Options  Bilateral leg numbness:   Hand numbness:   Nonintractable headache, unspecified chronicity pattern, unspecified headache type:   Diagnosis management comments: 25 yo  female with complaint of bilateral hand and leg numbness x months. Differential is broad but less concerning for significant acute pathology like ICH/mass. Neuro intact grossly. Plan  Ct head  poc chem 8  IVF  Analgesia  Reassess. Karissa AYERS [facility] [place]         Amount and/or Complexity of Data Reviewed  Clinical lab tests: ordered and reviewed  Tests in the radiology section of CPT®: reviewed and ordered           Procedures    Progress note    Labs and imaging reviewed. Karissa Hong [place]    No acute findings. Labs nml. Will refer to neurology. [facility] [place]      Patient's results have been reviewed with them. Patient and/or family have verbally conveyed their understanding and agreement of the patient's signs, symptoms, diagnosis, treatment and prognosis and additionally agree to follow up as recommended or return to the Emergency Room should their condition change prior to follow-up. Discharge instructions have also been provided to the patient with some educational information regarding their diagnosis as well a list of reasons why they would want to return to the ER prior to their follow-up appointment should their condition change.  Karissa Lindsay [place]

## 2019-08-07 ENCOUNTER — OFFICE VISIT (OUTPATIENT)
Dept: NEUROLOGY | Age: 24
End: 2019-08-07

## 2019-08-07 VITALS
WEIGHT: 150 LBS | HEART RATE: 78 BPM | HEIGHT: 63 IN | OXYGEN SATURATION: 100 % | RESPIRATION RATE: 16 BRPM | SYSTOLIC BLOOD PRESSURE: 120 MMHG | BODY MASS INDEX: 26.58 KG/M2 | DIASTOLIC BLOOD PRESSURE: 80 MMHG

## 2019-08-07 DIAGNOSIS — G35 MS (MULTIPLE SCLEROSIS) (HCC): Primary | ICD-10-CM

## 2019-08-07 DIAGNOSIS — R20.0 LEFT SIDED NUMBNESS: ICD-10-CM

## 2019-08-07 NOTE — PATIENT INSTRUCTIONS
[facility]   Statement to Patients  April 1, 2014      In an effort to ensure the large volume of patient prescription refills is processed in the most efficient and expeditious manner, we are asking our patients to assist us by calling your Pharmacy for all prescription refills, this will include also your  Mail Order Pharmacy. The pharmacy will contact our office electronically to continue the refill process. Please do not wait until the last minute to call your pharmacy. We need at least 48 hours (2days) to fill prescriptions. We also encourage you to call your pharmacy before going to  your prescription to make sure it is ready. With regard to controlled substance prescription refill requests (narcotic refills) that need to be picked up at our office, we ask your cooperation by providing us with at least 72 hours (3days) notice that you will need a refill. We will not refill narcotic prescription refill requests after 4:00pm on any weekday, Monday through Thursday, or after 2:00pm on Fridays, or on the weekends. We encourage everyone to explore another way of getting your prescription refill request processed using [employer], our patient web portal through our electronic medical record system. [employer] is an efficient and effective way to communicate your medication request directly to the office and  downloadable as an danish on your smart phone . [employer] also features a review functionality that allows you to view your medication list as well as leave messages for your physician. Are you ready to get connected? If so please review the attatched instructions or speak to any of our staff to get you set up right away! Thank you so much for your cooperation. Should you have any questions please contact our Practice Administrator.     The Physicians and Staff,  [facility]

## 2019-08-07 NOTE — PROGRESS NOTES
Chief Complaint   Patient presents with    Neurologic Problem     MS       Referred by: Dr. Kat Tang is a 54-year-old woman here for a second opinion regarding a diagnosis of multiple sclerosis. She had recently seen Dr. Ny Ahumada. In summary, she tells me that for the past 2-3 months she has had arm more than leg numbness on the left side. She had vertigo prompting ENT referral.  She is had urinary urgency but no incontinence. She has had MRIs done of the brain and C-spine and tells me that there were 2 abnormalities in the brain actively enhancing. She is scheduled for a steroid infusion next week. She goes on to tell me that prior to this event in March of this year she had an episode of left eye vision loss temporarily which resolved. I do not have any records to review. She and her  are here giving me good historical details. She continues to be symptomatic on the left arm and leg with numbness and pain now. Review of Systems   Constitutional: Positive for malaise/fatigue. Eyes: Negative for double vision. Musculoskeletal: Positive for myalgias. Neurological: Positive for sensory change. Psychiatric/Behavioral: Positive for depression. Negative for suicidal ideas. All other systems reviewed and are negative.       Past Medical History:   Diagnosis Date    ADHD (attention deficit hyperactivity disorder)     Psychiatric disorder     ADHD, OCD    Psychiatric problem     wellbutrin about 1 yr ago for anxiety    Psychiatric problem     adhd    Psychiatric problem     depression    Psychiatric problem     anxiety     Family History   Problem Relation Age of Onset    Hypertension Father     Cancer Maternal Grandfather     Cancer Paternal Grandfather     Diabetes Maternal Aunt      Social History     Socioeconomic History    Marital status: SINGLE     Spouse name: Not on file    Number of children: Not on file    Years of education: Not on file    Highest education level: Not on file   Occupational History    Not on file   Social Needs    Financial resource strain: Not on file    Food insecurity:     Worry: Not on file     Inability: Not on file    Transportation needs:     Medical: Not on file     Non-medical: Not on file   Tobacco Use    Smoking status: Current Every Day Smoker     Packs/day: 0.50    Smokeless tobacco: Never Used    Tobacco comment: quit during pregnancy   Substance and Sexual Activity    Alcohol use: Yes     Comment: rarely    Drug use: No    Sexual activity: Yes     Partners: Male     Birth control/protection: Condom   Lifestyle    Physical activity:     Days per week: Not on file     Minutes per session: Not on file    Stress: Not on file   Relationships    Social connections:     Talks on phone: Not on file     Gets together: Not on file     Attends Caodaism service: Not on file     Active member of club or organization: Not on file     Attends meetings of clubs or organizations: Not on file     Relationship status: Not on file    Intimate partner violence:     Fear of current or ex partner: Not on file     Emotionally abused: Not on file     Physically abused: Not on file     Forced sexual activity: Not on file   Other Topics Concern    Not on file   Social History Narrative    Not on file     Current Outpatient Medications   Medication Sig    ibuprofen (MOTRIN) 800 mg tablet Take 1 Tab by mouth three (3) times daily.  HYDROcodone-acetaminophen (NORCO) 5-325 mg per tablet Take 1 Tab by mouth every four (4) hours as needed for Pain. Max Daily Amount: 6 Tabs.  ondansetron (ZOFRAN ODT) 4 mg disintegrating tablet Take 1 Tab by mouth every eight (8) hours as needed for Nausea.  acetaminophen (TYLENOL) 325 mg tablet Take 2 Tabs by mouth every six (6) hours as needed for Pain.  citalopram (CELEXA) 10 mg tablet Take  by mouth daily. No current facility-administered medications for this visit.       Allergies   Allergen Reactions    Prednisone Other (comments)     Pt states \"it makes me paranoid\"    Tramadol Itching         Neurologic Exam     Mental Status   Oriented to person, place, and time. Cranial Nerves   Cranial nerves II through XII intact. Motor Exam   Muscle bulk: normal    Strength   Strength 5/5 throughout. Sensory Exam   Decreased sensation left side nondermatomal     Gait, Coordination, and Reflexes     Gait  Gait: normal    Coordination   Finger to nose coordination: abnormal (Left side mildly ataxic)Hyperreflexic left arm and leg    Increased tone left side     Physical Exam   Constitutional: She is oriented to person, place, and time. She appears well-developed and well-nourished. Cardiovascular: Normal rate. Pulmonary/Chest: Effort normal.   Neurological: She is oriented to person, place, and time. She has normal strength. She has an abnormal Finger-Nose-Finger Test (Left side mildly ataxic). Gait normal.   Skin: Skin is warm and dry. Psychiatric: Her behavior is normal.   Vitals reviewed. Visit Vitals  /80 (BP 1 Location: Left arm, BP Patient Position: Sitting)   Pulse 78   Resp 16   Ht 5' 3\" (1.6 m)   Wt 68 kg (150 lb)   SpO2 100%   BMI 26.57 kg/m²       Lab Results   Component Value Date/Time    WBC 3.9 01/05/2017 01:28 PM    HGB 8.8 (L) 01/05/2017 01:28 PM    HCT 27.3 (L) 01/05/2017 01:28 PM    Hematocrit (POC) 44 06/13/2019 08:36 PM    PLATELET 104 38/15/2193 01:28 PM    MCV 88.9 01/05/2017 01:28 PM     Lab Results   Component Value Date/Time    Glucose 101 (H) 01/05/2017 01:28 PM    Glucose (POC) 78 06/13/2019 08:36 PM    Creatinine (POC) 0.8 06/13/2019 08:36 PM    Creatinine 0.67 01/05/2017 01:28 PM      No results found for: CHOL, CHOLPOCT, HDL, LDL, LDLC, LDLCPOC, LDLCEXT, TRIGL, TGLPOCT, CHHD, CHHDX  Lab Results   Component Value Date/Time    ALT (SGPT) 21 01/03/2017 03:18 PM    AST (SGOT) 12 (L) 01/03/2017 03:18 PM    Alk.  phosphatase 59 01/03/2017 03:18 PM    Bilirubin, total 0.2 01/03/2017 03:18 PM    Albumin 3.3 (L) 01/03/2017 03:18 PM    Protein, total 7.1 01/03/2017 03:18 PM    PLATELET 007 01/06/2020 01:28 PM        CT Results (maximum last 3): Results from [place] encounter on 06/13/19   CT HEAD WO CONT    Narrative EXAM: CT HEAD WO CONT    INDICATION: headache, numbness    COMPARISON: 9/7/2009 CT. CONTRAST: None. TECHNIQUE: Unenhanced CT of the head was performed using 5 mm images. Brain and  bone windows were generated. CT dose reduction was achieved through use of a  standardized protocol tailored for this examination and automatic exposure  control for dose modulation. FINDINGS:  The ventricles and sulci are normal in size, shape and configuration and  midline. There is no significant white matter disease. There is no intracranial  hemorrhage, extra-axial collection, mass, mass effect or midline shift. Low-lying cerebellar tonsils are again noted. The basilar cisterns are open. No  acute infarct is identified. The bone windows demonstrate no abnormalities. The  visualized portions of the paranasal sinuses and mastoid air cells are clear. Impression IMPRESSION: Low-lying cerebellar tonsils again shown. No acute findings. Results from [place] encounter on 12/08/15   CT ABD PELV WO CONT    Narrative **Final Report**       ICD Codes / Adm. Diagnosis: 550134   / Flank Pain  Back pains  Examination:  CT ABD PELVIS WO CON  - [id number] - Dec  8 2015  9:18AM  Accession No:  [id number]  Reason:  L flank pain      REPORT:  Clinical History: Bilateral flank pain with urinary discomfort and nausea   for 2 days    Technique: 5 mm axial images were obtained through the abdomen and pelvis   without oral or intravenous contrast. Coronal and sagittal reformats were   performed. Comparison September 7, 2009. Findings: The lung bases are clear. The heart size is normal.    The liver is normal. The spleen demonstrates no abnormality.  The pancreas has a normal appearance. The gallbladder demonstrates no evidence of wall   thickening or cholelithiasis. There is a 1 mm nonobstructing stone in the   mid left kidney. No right-sided renal stones. No hydronephrosis. There is no retroperitoneal or mesenteric lymphadenopathy. No   intraperitoneal free air or free fluid is seen. The stomach, small bowel,   and colon are unremarkable. The appendix is normal.    Imaging of the pelvis demonstrates a normal uterus and adnexa. .. The urinary   bladder demonstrates no filling defect. No pelvic mass or free fluid is seen. Review of the bone windows demonstrates no evidence of destructive bone   lesion. IMPRESSION:    1. Nonobstructing 1 mm stone left kidney. No acute abnormality              Signing/Reading Doctor: Cj Sanches ([id number])    Approved: Fawad FLETCHER ([id number])  Dec  8 2015  9:30AM                                       Assessment and Plan   Diagnoses and all orders for this visit:    1. MS (multiple sclerosis) ([facility] [address]    2. Left sided numbness      58-year-old woman who has a recent diagnosis of multiple sclerosis. We talked at length about her symptoms and progression thereof in great detail. It sounds like she probably had a clinically isolated event in March consistent with optic neuritis which resolved. Now she is having new symptoms on the left side still persisting which may be permanent. She has abnormal imaging which they tell me she has 2 spots of abnormalities but nothing beyond that. It sounds like she has rather low disease burden. I do agree with the diagnosis clinically and historically. I agree that she is going to have steroid infusions next week which she should proceed with. We also talked about options for disease modifying therapy and I answered questions regarding the options that she had been presented.   Once I receive her records I will review them if there is anything concerning that could question the diagnosis I will reach out to her otherwise I would recommend she continue with the plan already in progress. It sounds like her neurologist is considering sending her to an [facility] [address] specialist which I think would be appropriate as well. I reviewed and decided to continue the current medications. A notice of this visit/encounter being completed has been sent electronically to the patient's PCP and/or referring provider.      [address] [place] [address], [facility] [address]  Diplomate PAULYN

## (undated) DEVICE — (D)SYR 10ML 1/5ML GRAD NSAF -- PKGING CHANGE USE ITEM 338027

## (undated) DEVICE — NEEDLE SPNL 22GA L3.5IN BLK HUB S STL REG WALL FIT STYL W/

## (undated) DEVICE — TUBING SUCT L6FT ID0.5IN CLR PLAS M CONN

## (undated) DEVICE — KENDALL DL ECG CABLE AND LEAD WIRE SYSTEM, 3-LEAD, SINGLE PATIENT USE: Brand: KENDALL

## (undated) DEVICE — COLLECTION KT SUC TISS BERK -- GYRUS

## (undated) DEVICE — FILTER SET UTER VAC CURET SYS -- GYRUS

## (undated) DEVICE — PREP PAD BNS: Brand: CONVERTORS

## (undated) DEVICE — SOLUTION IRRIGATION NACL 0.9% 1000 ML FLX CONTAINER

## (undated) DEVICE — SOLUTION LACTATED RINGERS INJECTION USP

## (undated) DEVICE — Z INACTIVE NO USAGE TURNOVER KIT RM CLEANOP

## (undated) DEVICE — SOLUTION IV STRL H2O 500 ML AQUALITE POUR BTL

## (undated) DEVICE — TRAP TISS DISP FOR COLL SYS BERK SAFETOUCH

## (undated) DEVICE — LIGHT HANDLE: Brand: DEVON

## (undated) DEVICE — D&C/GYN-LF: Brand: MEDLINE INDUSTRIES, INC.

## (undated) DEVICE — STERILE POLYISOPRENE POWDER-FREE SURGICAL GLOVES: Brand: PROTEXIS